# Patient Record
Sex: MALE | Race: BLACK OR AFRICAN AMERICAN | NOT HISPANIC OR LATINO | Employment: UNEMPLOYED | URBAN - METROPOLITAN AREA
[De-identification: names, ages, dates, MRNs, and addresses within clinical notes are randomized per-mention and may not be internally consistent; named-entity substitution may affect disease eponyms.]

---
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
## 2021-05-24 ENCOUNTER — HOSPITAL ENCOUNTER (EMERGENCY)
Facility: MEDICAL CENTER | Age: 21
End: 2021-05-24
Attending: EMERGENCY MEDICINE
Payer: MEDICAID

## 2021-05-24 ENCOUNTER — APPOINTMENT (OUTPATIENT)
Dept: RADIOLOGY | Facility: MEDICAL CENTER | Age: 21
End: 2021-05-24
Attending: EMERGENCY MEDICINE
Payer: MEDICAID

## 2021-05-24 VITALS
HEIGHT: 74 IN | TEMPERATURE: 97.8 F | OXYGEN SATURATION: 95 % | RESPIRATION RATE: 16 BRPM | HEART RATE: 80 BPM | SYSTOLIC BLOOD PRESSURE: 120 MMHG | WEIGHT: 185.85 LBS | BODY MASS INDEX: 23.85 KG/M2 | DIASTOLIC BLOOD PRESSURE: 89 MMHG

## 2021-05-24 DIAGNOSIS — K29.00 ACUTE GASTRITIS, PRESENCE OF BLEEDING UNSPECIFIED, UNSPECIFIED GASTRITIS TYPE: ICD-10-CM

## 2021-05-24 DIAGNOSIS — R10.13 EPIGASTRIC PAIN: ICD-10-CM

## 2021-05-24 LAB
ALBUMIN SERPL BCP-MCNC: 4.5 G/DL (ref 3.2–4.9)
ALBUMIN/GLOB SERPL: 1.4 G/DL
ALP SERPL-CCNC: 77 U/L (ref 30–99)
ALT SERPL-CCNC: 18 U/L (ref 2–50)
ANION GAP SERPL CALC-SCNC: 10 MMOL/L (ref 7–16)
APPEARANCE UR: CLEAR
AST SERPL-CCNC: 17 U/L (ref 12–45)
BASOPHILS # BLD AUTO: 0.4 % (ref 0–1.8)
BASOPHILS # BLD: 0.02 K/UL (ref 0–0.12)
BILIRUB SERPL-MCNC: 0.3 MG/DL (ref 0.1–1.5)
BILIRUB UR QL STRIP.AUTO: NEGATIVE
BUN SERPL-MCNC: 13 MG/DL (ref 8–22)
CALCIUM SERPL-MCNC: 9.9 MG/DL (ref 8.5–10.5)
CHLORIDE SERPL-SCNC: 105 MMOL/L (ref 96–112)
CO2 SERPL-SCNC: 26 MMOL/L (ref 20–33)
COLOR UR: YELLOW
CREAT SERPL-MCNC: 0.9 MG/DL (ref 0.5–1.4)
EOSINOPHIL # BLD AUTO: 0.05 K/UL (ref 0–0.51)
EOSINOPHIL NFR BLD: 1 % (ref 0–6.9)
ERYTHROCYTE [DISTWIDTH] IN BLOOD BY AUTOMATED COUNT: 39.9 FL (ref 35.9–50)
GLOBULIN SER CALC-MCNC: 3.3 G/DL (ref 1.9–3.5)
GLUCOSE SERPL-MCNC: 84 MG/DL (ref 65–99)
GLUCOSE UR STRIP.AUTO-MCNC: NEGATIVE MG/DL
HCT VFR BLD AUTO: 51.8 % (ref 42–52)
HGB BLD-MCNC: 16.8 G/DL (ref 14–18)
IMM GRANULOCYTES # BLD AUTO: 0.01 K/UL (ref 0–0.11)
IMM GRANULOCYTES NFR BLD AUTO: 0.2 % (ref 0–0.9)
KETONES UR STRIP.AUTO-MCNC: ABNORMAL MG/DL
LEUKOCYTE ESTERASE UR QL STRIP.AUTO: NEGATIVE
LIPASE SERPL-CCNC: 19 U/L (ref 11–82)
LYMPHOCYTES # BLD AUTO: 2.51 K/UL (ref 1–4.8)
LYMPHOCYTES NFR BLD: 51.9 % (ref 22–41)
MCH RBC QN AUTO: 28.8 PG (ref 27–33)
MCHC RBC AUTO-ENTMCNC: 32.4 G/DL (ref 33.7–35.3)
MCV RBC AUTO: 88.9 FL (ref 81.4–97.8)
MICRO URNS: ABNORMAL
MONOCYTES # BLD AUTO: 0.54 K/UL (ref 0–0.85)
MONOCYTES NFR BLD AUTO: 11.2 % (ref 0–13.4)
NEUTROPHILS # BLD AUTO: 1.71 K/UL (ref 1.82–7.42)
NEUTROPHILS NFR BLD: 35.3 % (ref 44–72)
NITRITE UR QL STRIP.AUTO: NEGATIVE
NRBC # BLD AUTO: 0 K/UL
NRBC BLD-RTO: 0 /100 WBC
PH UR STRIP.AUTO: 5.5 [PH] (ref 5–8)
PLATELET # BLD AUTO: 203 K/UL (ref 164–446)
PMV BLD AUTO: 10.3 FL (ref 9–12.9)
POTASSIUM SERPL-SCNC: 4 MMOL/L (ref 3.6–5.5)
PROT SERPL-MCNC: 7.8 G/DL (ref 6–8.2)
PROT UR QL STRIP: NEGATIVE MG/DL
RBC # BLD AUTO: 5.83 M/UL (ref 4.7–6.1)
RBC UR QL AUTO: NEGATIVE
SODIUM SERPL-SCNC: 141 MMOL/L (ref 135–145)
SP GR UR STRIP.AUTO: 1.03
UROBILINOGEN UR STRIP.AUTO-MCNC: 1 MG/DL
WBC # BLD AUTO: 4.8 K/UL (ref 4.8–10.8)

## 2021-05-24 PROCEDURE — 85025 COMPLETE CBC W/AUTO DIFF WBC: CPT

## 2021-05-24 PROCEDURE — 99284 EMERGENCY DEPT VISIT MOD MDM: CPT

## 2021-05-24 PROCEDURE — 80053 COMPREHEN METABOLIC PANEL: CPT

## 2021-05-24 PROCEDURE — 83690 ASSAY OF LIPASE: CPT

## 2021-05-24 PROCEDURE — 76705 ECHO EXAM OF ABDOMEN: CPT

## 2021-05-24 PROCEDURE — 81003 URINALYSIS AUTO W/O SCOPE: CPT

## 2021-05-24 RX ORDER — OMEPRAZOLE 20 MG/1
20 CAPSULE, DELAYED RELEASE ORAL DAILY
Qty: 30 CAPSULE | Refills: 0 | Status: SHIPPED | OUTPATIENT
Start: 2021-05-24 | End: 2021-10-08 | Stop reason: SDUPTHER

## 2021-05-24 RX ORDER — SUCRALFATE ORAL 1 G/10ML
1 SUSPENSION ORAL
Qty: 200 ML | Refills: 2 | Status: SHIPPED | OUTPATIENT
Start: 2021-05-24 | End: 2022-03-01

## 2021-05-24 NOTE — DISCHARGE INSTRUCTIONS
Avoid ibuprofen, Naprosyn, Advil, Motrin, and other NSAIDs    You may take Tylenol as needed for pain    You have gallbladder sludge seen on your ultrasound today but no gallstones.  Your gallbladder may be contributing to your pain but is more likely that you are having gastritis or inflammation of the stomach lining.  This is treated with an antacid.  We are prescribing Prilosec and have also recommended that you take Carafate for your symptoms.  Avoid spicy foods.    Return to the ER for ongoing pain, worsening pain, vomiting, fever, or other concerns.    Follow-up with a primary care doctor at the \[facility]\"" clinic as well as a stomach doctor for possible endoscopy where they look with a camera at your stomach lining to make sure you do not have ulcers or other issues.

## 2021-05-24 NOTE — ED TRIAGE NOTES
"Chief Complaint   Patient presents with   • Abdominal Pain     x 1 month, generalized intermittent burning pain. pt denies N/V/D.      Pt ambulatory to triage for above.   Pt denies N/V/D, fevers, and dysuria.     /89   Pulse 89   Temp 36.6 °C (97.8 °F) (Temporal)   Resp 16   Ht 1.88 m (6' 2\")   Wt 84.3 kg (185 lb 13.6 oz)   SpO2 95%   BMI 23.86 kg/m²     "

## 2021-05-24 NOTE — ED PROVIDER NOTES
ED Provider Note    CHIEF COMPLAINT  Chief Complaint   Patient presents with   • Abdominal Pain     x 1 month, generalized intermittent burning pain. pt denies N/V/D.        HPI  Iain Murray is a 21 y.o. male with history of asthma who presents complaining of epigastric burning sensation for 1 month. Patient recalls eating hot wings in [place] while visiting his brother just prior to onset of symptoms. He states this has been a daily occurrence and seems to get worse when he eats spicy food.    Patient denies nausea, vomiting, diarrhea, melena, hematochezia, chest pain, shortness of breath, back pain. Mom has been medicating the patient with ibuprofen.    Patient has never had endoscopy. No family history of ulcers. Patient denies tobacco and alcohol use.      ALLERGIES  No Known Allergies    CURRENT MEDICATIONS  Home Medications     Reviewed by Alison Reich R.N. (Registered Nurse) on 05/24/21 at 1019  Med List Status: Partial   Medication Last Dose Status   acetaminophen/caffeine/butalbital 325-40-50 mg (FIORICET) -40 MG Tab  Active   albuterol (PROVENTIL) 2.5mg/0.5ml Nebu Soln  Active   amoxicillin (AMOXIL) 500 MG Cap  Active   hydrocodone-acetaminophen (NORCO) 5-325 MG Tab per tablet  Active   SUMAtriptan (IMITREX) 25 MG Tab tablet  Active                PAST MEDICAL HISTORY   has a past medical history of Dental abscess, Migraine, Strep throat, and Unspecified asthma(493.90).    SURGICAL HISTORY  patient denies any surgical history    SOCIAL HISTORY  Social History     Tobacco Use   • Smoking status: Never Smoker   Substance and Sexual Activity   • Alcohol use: No   • Drug use: No   • Sexual activity: Not on file       Family Hx:  No family history of GERD or peptic ulcer disease      REVIEW OF SYSTEMS  See HPI for further details.  All other systems are negative except as above in HPI.    PHYSICAL EXAM  VITAL SIGNS: /89   Pulse 80   Temp 36.6 °C (97.8 °F) (Temporal)   Resp 16   Ht  "1.88 m (6' 2\")   Wt 84.3 kg (185 lb 13.6 oz)   SpO2 95%   BMI 23.86 kg/m²    General:  WDWN, nontoxic appearing in NAD; A+Ox3; V/S as above   Skin: warm and dry; good color; no rash  HEENT: NCAT; EOMs intact; PERRL; no scleral icterus   Neck: FROM; soft  Cardiovascular: Regular heart rate and rhythm.  No murmurs, rubs, or gallops; pulses 2+ bilaterally radially  Lungs: Clear to auscultation with good air movement bilaterally.  No wheezes, rhonchi, or rales.   Abdomen: BS present; soft; minimal epigastric tenderness, ND; no rebound, guarding, or rigidity.  No organomegaly or pulsatile mass  Extremities: DRAKE x 4; no e/o trauma  Neurologic: CNs III-XII grossly intact; speech clear; distal sensation intact; strength 5/5 UE/LEs  Psychiatric: Appropriate affect, normal mood    LABS  Results for orders placed or performed during the hospital encounter of 05/24/21   CBC WITH DIFFERENTIAL   Result Value Ref Range    WBC 4.8 4.8 - 10.8 K/uL    RBC 5.83 4.70 - 6.10 M/uL    Hemoglobin 16.8 14.0 - 18.0 g/dL    Hematocrit 51.8 42.0 - 52.0 %    MCV 88.9 81.4 - 97.8 fL    MCH 28.8 27.0 - 33.0 pg    MCHC 32.4 (L) 33.7 - 35.3 g/dL    RDW 39.9 35.9 - 50.0 fL    Platelet Count 203 164 - 446 K/uL    MPV 10.3 9.0 - 12.9 fL    Neutrophils-Polys 35.30 (L) 44.00 - 72.00 %    Lymphocytes 51.90 (H) 22.00 - 41.00 %    Monocytes 11.20 0.00 - 13.40 %    Eosinophils 1.00 0.00 - 6.90 %    Basophils 0.40 0.00 - 1.80 %    Immature Granulocytes 0.20 0.00 - 0.90 %    Nucleated RBC 0.00 /100 WBC    Neutrophils (Absolute) 1.71 (L) 1.82 - 7.42 K/uL    Lymphs (Absolute) 2.51 1.00 - 4.80 K/uL    Monos (Absolute) 0.54 0.00 - 0.85 K/uL    Eos (Absolute) 0.05 0.00 - 0.51 K/uL    Baso (Absolute) 0.02 0.00 - 0.12 K/uL    Immature Granulocytes (abs) 0.01 0.00 - 0.11 K/uL    NRBC (Absolute) 0.00 K/uL   COMP METABOLIC PANEL   Result Value Ref Range    Sodium 141 135 - 145 mmol/L    Potassium 4.0 3.6 - 5.5 mmol/L    Chloride 105 96 - 112 mmol/L    Co2 26 20 - 33 " mmol/L    Anion Gap 10.0 7.0 - 16.0    Glucose 84 65 - 99 mg/dL    Bun 13 8 - 22 mg/dL    Creatinine 0.90 0.50 - 1.40 mg/dL    Calcium 9.9 8.5 - 10.5 mg/dL    AST(SGOT) 17 12 - 45 U/L    ALT(SGPT) 18 2 - 50 U/L    Alkaline Phosphatase 77 30 - 99 U/L    Total Bilirubin 0.3 0.1 - 1.5 mg/dL    Albumin 4.5 3.2 - 4.9 g/dL    Total Protein 7.8 6.0 - 8.2 g/dL    Globulin 3.3 1.9 - 3.5 g/dL    A-G Ratio 1.4 g/dL   LIPASE   Result Value Ref Range    Lipase 19 11 - 82 U/L   URINALYSIS    Specimen: Urine   Result Value Ref Range    Color Yellow     Character Clear     Specific Gravity 1.026 <1.035    Ph 5.5 5.0 - 8.0    Glucose Negative Negative mg/dL    Ketones Trace (A) Negative mg/dL    Protein Negative Negative mg/dL    Bilirubin Negative Negative    Urobilinogen, Urine 1.0 Negative    Nitrite Negative Negative    Leukocyte Esterase Negative Negative    Occult Blood Negative Negative    Micro Urine Req see below    ESTIMATED GFR   Result Value Ref Range    GFR If African American >60 >60 mL/min/1.73 m 2    GFR If Non African American >60 >60 mL/min/1.73 m 2       IMAGING  US-RUQ   Final Result      1.  Small amount of sludge within the gallbladder. No gallstones or biliary ductal dilatation.      2.  Small right renal calyceal stone.          MEDICAL RECORD  I have reviewed patient's medical record and pertinent results are listed below.      COURSE & MEDICAL DECISION MAKING  I have reviewed any medical record information, laboratory studies and radiographic results as noted.    Iain Murray is a 21 y.o. male who presents complaining of epigastric burning which seems consistent with gastritis. Patient is afebrile and nontoxic-appearing with a soft, nonsurgical abdomen. He has some minimal epigastric tenderness. Labs demonstrate no significant abnormalities. Ultrasound was ordered and demonstrated gallbladder sludge which may be an incidental finding or may be contributing to his symptoms. This can be further  investigated if he does not respond to PPI therapy. We discussed dietary changes, return precautions, GI referral, and following up with a PCP which she has scheduled for later this month.    Appropriate PPE was worn at all times while interacting with the patient, including goggles, N95 mask, and surgical mask.      FINAL IMPRESSION  1. Epigastric pain     2. Acute gastritis, presence of bleeding unspecified, unspecified gastritis type         Electronically signed by: Babita Flores M.D., 5/24/2021 11:14 AM

## 2021-09-27 NOTE — PROGRESS NOTES
"HPI  Iain Murray is a 21 y.o. male presenting for evaluation of stomach pains.    Last evaluated in 7/21 for possible PUD - started on combo of omeprazole and famotidine, given GI referral as well as H pylori testing, which was not completed.  Pt reports that symptoms did resolve with use of all three medications omeprazole, famotidine, and as needed carafate.  Has met with GI, scheduled for EGD in Nov.    Does report radiation of pain to his back as well as mild constipation (stools 3x per week and stools are somewhat hard).  Denies diarrhea, nausea, vomiting.    Depression/anxiety -requesting refill of Zoloft.  Does feel as though medicine has helped, but is not giving him significant relief from anxiety.  Discussed increasing dose, he is open to this.     Insomnia-also reporting difficulties with sleep onset.  Has decreased screen time prior to bed, but has not made changes to routine prior to bed or consistent schedule.        PMH   Past Medical History:   Diagnosis Date   • Dental abscess    • Migraine    • Strep throat    • Unspecified asthma(493.90)        No past surgical history on file.    Social History     Tobacco Use   • Smoking status: Never Smoker   • Smokeless tobacco: Never Used   Substance Use Topics   • Alcohol use: No       No family history on file.      PE  /88 (BP Location: Right arm, Patient Position: Sitting, BP Cuff Size: Adult)   Pulse 75   Temp 37 °C (98.6 °F) (Temporal)   Resp 16   Ht 1.88 m (6' 2\")   Wt 88 kg (194 lb 1.6 oz)   SpO2 95%   BMI 24.92 kg/m²     Physical Exam  Constitutional:       General: He is not in acute distress.     Appearance: Normal appearance. He is not ill-appearing.   HENT:      Head: Normocephalic and atraumatic.      Nose: Nose normal.      Mouth/Throat:      Mouth: Mucous membranes are moist.      Pharynx: Oropharynx is clear.   Eyes:      Extraocular Movements: Extraocular movements intact.      Conjunctiva/sclera: Conjunctivae normal. "   Cardiovascular:      Rate and Rhythm: Normal rate and regular rhythm.      Pulses: Normal pulses.      Heart sounds: Normal heart sounds. No murmur heard.     Pulmonary:      Effort: Pulmonary effort is normal. No respiratory distress.      Breath sounds: Normal breath sounds. No wheezing, rhonchi or rales.   Abdominal:      General: Abdomen is flat. Bowel sounds are normal. There is no distension.      Palpations: Abdomen is soft. There is no mass.      Tenderness: There is no abdominal tenderness.   Musculoskeletal:         General: No swelling, deformity or signs of injury. Normal range of motion.      Cervical back: Normal range of motion and neck supple.   Lymphadenopathy:      Cervical: No cervical adenopathy.   Skin:     General: Skin is warm and dry.      Findings: No rash.   Neurological:      General: No focal deficit present.      Mental Status: He is alert and oriented to person, place, and time.          A/P:  Epigastric pain  Significant improvement in symptoms with combination omeprazole and famotidine  Has followed up with GI, as scheduled for EGD in November 2021    Plan:  Continue current medication regimen-we will refill omeprazole and famotidine  Call back with worsening symptoms, consider Maalox, GI cocktail in the future instead of sucralfate  Follow-up in approximately 3 months after appointment for EGD    Mixed anxiety depressive disorder  Getting benefit from Zoloft, but does not feel as though it is strong enough  Also struggling with insomnia still as below  Has not been able to find a counselor/therapist yet    Plan:  Increase Zoloft dose to 100 mg daily  Schedule meet and greet appointment with Dr. Corona through our clinic  We will follow up symptoms on increased dose    Insomnia  Onset insomnia, difficulty falling asleep  Has tried some sleep hygiene changes, but only eliminating screen time prior to bed  Discussed sleep hygiene including consistent schedule and routine prior to  bed  Information provided in AVS  May benefit from increased Zoloft dose

## 2021-10-08 ENCOUNTER — OFFICE VISIT (OUTPATIENT)
Dept: MEDICAL GROUP | Facility: CLINIC | Age: 21
End: 2021-10-08
Payer: MEDICAID

## 2021-10-08 VITALS
SYSTOLIC BLOOD PRESSURE: 127 MMHG | DIASTOLIC BLOOD PRESSURE: 88 MMHG | HEIGHT: 74 IN | TEMPERATURE: 98.6 F | WEIGHT: 194.1 LBS | RESPIRATION RATE: 16 BRPM | OXYGEN SATURATION: 95 % | BODY MASS INDEX: 24.91 KG/M2 | HEART RATE: 75 BPM

## 2021-10-08 DIAGNOSIS — F41.8 MIXED ANXIETY DEPRESSIVE DISORDER: ICD-10-CM

## 2021-10-08 DIAGNOSIS — R10.13 EPIGASTRIC PAIN: ICD-10-CM

## 2021-10-08 DIAGNOSIS — F51.01 PRIMARY INSOMNIA: ICD-10-CM

## 2021-10-08 PROBLEM — R51.9 HEADACHE: Status: ACTIVE | Noted: 2017-05-01

## 2021-10-08 PROBLEM — J45.990 ASTHMA, EXERCISE INDUCED: Status: ACTIVE | Noted: 2017-05-01

## 2021-10-08 PROBLEM — J30.9 ALLERGIC RHINITIS: Status: ACTIVE | Noted: 2018-11-20

## 2021-10-08 PROBLEM — G47.00 INSOMNIA: Status: ACTIVE | Noted: 2018-02-13

## 2021-10-08 PROBLEM — M54.9 BACKACHE: Status: ACTIVE | Noted: 2017-10-24

## 2021-10-08 PROBLEM — R45.89 DEPRESSED MOOD: Status: ACTIVE | Noted: 2021-05-27

## 2021-10-08 PROCEDURE — 99214 OFFICE O/P EST MOD 30 MIN: CPT | Mod: GC | Performed by: STUDENT IN AN ORGANIZED HEALTH CARE EDUCATION/TRAINING PROGRAM

## 2021-10-08 RX ORDER — FAMOTIDINE 20 MG/1
20 TABLET, FILM COATED ORAL
Qty: 90 TABLET | Refills: 1 | Status: SHIPPED | OUTPATIENT
Start: 2021-10-08 | End: 2022-12-09 | Stop reason: SDUPTHER

## 2021-10-08 RX ORDER — OMEPRAZOLE 20 MG/1
20 CAPSULE, DELAYED RELEASE ORAL DAILY
Qty: 90 CAPSULE | Refills: 1 | Status: SHIPPED | OUTPATIENT
Start: 2021-10-08 | End: 2022-03-01

## 2021-10-08 RX ORDER — FAMOTIDINE 20 MG/1
20 TABLET, FILM COATED ORAL
COMMUNITY
Start: 2021-07-20 | End: 2021-10-08 | Stop reason: SDUPTHER

## 2021-10-08 RX ORDER — SERTRALINE HYDROCHLORIDE 100 MG/1
100 TABLET, FILM COATED ORAL DAILY
Qty: 100 TABLET | Refills: 3 | Status: SHIPPED | OUTPATIENT
Start: 2021-10-08 | End: 2022-03-01

## 2021-10-08 ASSESSMENT — FIBROSIS 4 INDEX: FIB4 SCORE: 0.41

## 2021-10-08 NOTE — ASSESSMENT & PLAN NOTE
Onset insomnia, difficulty falling asleep  Has tried some sleep hygiene changes, but only eliminating screen time prior to bed  Discussed sleep hygiene including consistent schedule and routine prior to bed  Information provided in AVS  May benefit from increased Zoloft dose

## 2021-10-08 NOTE — PATIENT INSTRUCTIONS
Insomnia  Insomnia is a sleep disorder that makes it difficult to fall asleep or stay asleep. Insomnia can cause fatigue, low energy, difficulty concentrating, mood swings, and poor performance at work or school.  There are three different ways to classify insomnia:  · Difficulty falling asleep.  · Difficulty staying asleep.  · Waking up too early in the morning.  Any type of insomnia can be long-term (chronic) or short-term (acute). Both are common. Short-term insomnia usually lasts for three months or less. Chronic insomnia occurs at least three times a week for longer than three months.  What are the causes?  Insomnia may be caused by another condition, situation, or substance, such as:  · Anxiety.  · Certain medicines.  · Gastroesophageal reflux disease (GERD) or other gastrointestinal conditions.  · Asthma or other breathing conditions.  · Restless legs syndrome, sleep apnea, or other sleep disorders.  · Chronic pain.  · Menopause.  · Stroke.  · Abuse of alcohol, tobacco, or illegal drugs.  · Mental health conditions, such as depression.  · Caffeine.  · Neurological disorders, such as Alzheimer's disease.  · An overactive thyroid (hyperthyroidism).  Sometimes, the cause of insomnia may not be known.  What increases the risk?  Risk factors for insomnia include:  · Gender. Women are affected more often than men.  · Age. Insomnia is more common as you get older.  · Stress.  · Lack of exercise.  · Irregular work schedule or working night shifts.  · Traveling between different time zones.  · Certain medical and mental health conditions.  What are the signs or symptoms?  If you have insomnia, the main symptom is having trouble falling asleep or having trouble staying asleep. This may lead to other symptoms, such as:  · Feeling fatigued or having low energy.  · Feeling nervous about going to sleep.  · Not feeling rested in the morning.  · Having trouble concentrating.  · Feeling irritable, anxious, or depressed.  How  is this diagnosed?  This condition may be diagnosed based on:  · Your symptoms and medical history. Your health care provider may ask about:  ? Your sleep habits.  ? Any medical conditions you have.  ? Your mental health.  · A physical exam.  How is this treated?  Treatment for insomnia depends on the cause. Treatment may focus on treating an underlying condition that is causing insomnia. Treatment may also include:  · Medicines to help you sleep.  · Counseling or therapy.  · Lifestyle adjustments to help you sleep better.  Follow these instructions at home:  Eating and drinking    · Limit or avoid alcohol, caffeinated beverages, and cigarettes, especially close to bedtime. These can disrupt your sleep.  · Do not eat a large meal or eat spicy foods right before bedtime. This can lead to digestive discomfort that can make it hard for you to sleep.  Sleep habits    · Keep a sleep diary to help you and your health care provider figure out what could be causing your insomnia. Write down:  ? When you sleep.  ? When you wake up during the night.  ? How well you sleep.  ? How rested you feel the next day.  ? Any side effects of medicines you are taking.  ? What you eat and drink.  · Make your bedroom a dark, comfortable place where it is easy to fall asleep.  ? Put up shades or blackout curtains to block light from outside.  ? Use a white noise machine to block noise.  ? Keep the temperature cool.  · Limit screen use before bedtime. This includes:  ? Watching TV.  ? Using your smartphone, tablet, or computer.  · Stick to a routine that includes going to bed and waking up at the same times every day and night. This can help you fall asleep faster. Consider making a quiet activity, such as reading, part of your nighttime routine.  · Try to avoid taking naps during the day so that you sleep better at night.  · Get out of bed if you are still awake after 15 minutes of trying to sleep. Keep the lights down, but try reading or  doing a quiet activity. When you feel sleepy, go back to bed.  General instructions  · Take over-the-counter and prescription medicines only as told by your health care provider.  · Exercise regularly, as told by your health care provider. Avoid exercise starting several hours before bedtime.  · Use relaxation techniques to manage stress. Ask your health care provider to suggest some techniques that may work well for you. These may include:  ? Breathing exercises.  ? Routines to release muscle tension.  ? Visualizing peaceful scenes.  · Make sure that you drive carefully. Avoid driving if you feel very sleepy.  · Keep all follow-up visits as told by your health care provider. This is important.  Contact a health care provider if:  · You are tired throughout the day.  · You have trouble in your daily routine due to sleepiness.  · You continue to have sleep problems, or your sleep problems get worse.  Get help right away if:  · You have serious thoughts about hurting yourself or someone else.  If you ever feel like you may hurt yourself or others, or have thoughts about taking your own life, get help right away. You can go to your nearest emergency department or call:  · Your local emergency services (911 in the [place]  · A suicide crisis helpline, such as the National Suicide Prevention Lifeline at 1-569.227.3390. This is open 24 hours a day.  Summary  · Insomnia is a sleep disorder that makes it difficult to fall asleep or stay asleep.  · Insomnia can be long-term (chronic) or short-term (acute).  · Treatment for insomnia depends on the cause. Treatment may focus on treating an underlying condition that is causing insomnia.  · Keep a sleep diary to help you and your health care provider figure out what could be causing your insomnia.  This information is not intended to replace advice given to you by your health care provider. Make sure you discuss any questions you have with your health care provider.  Document  Released: 12/15/2001 Document Revised: 11/30/2018 Document Reviewed: 09/27/2018  Elsevier Patient Education © 2020 [employer]

## 2021-10-08 NOTE — ASSESSMENT & PLAN NOTE
Significant improvement in symptoms with combination omeprazole and famotidine  Has followed up with GI, as scheduled for EGD in November 2021    Plan:  Continue current medication regimen-we will refill omeprazole and famotidine  Call back with worsening symptoms, consider Maalox, GI cocktail in the future instead of sucralfate  Follow-up in approximately 3 months after appointment for EGD

## 2021-10-08 NOTE — ASSESSMENT & PLAN NOTE
Getting benefit from Zoloft, but does not feel as though it is strong enough  Also struggling with insomnia still as below  Has not been able to find a counselor/therapist yet    Plan:  Increase Zoloft dose to 100 mg daily  Schedule meet and greet appointment with Dr. Corona through our clinic  We will follow up symptoms on increased dose

## 2022-03-01 ENCOUNTER — OFFICE VISIT (OUTPATIENT)
Dept: MEDICAL GROUP | Facility: CLINIC | Age: 22
End: 2022-03-01
Payer: MEDICAID

## 2022-03-01 VITALS
WEIGHT: 181 LBS | SYSTOLIC BLOOD PRESSURE: 135 MMHG | HEIGHT: 74 IN | DIASTOLIC BLOOD PRESSURE: 85 MMHG | HEART RATE: 89 BPM | OXYGEN SATURATION: 96 % | BODY MASS INDEX: 23.23 KG/M2

## 2022-03-01 DIAGNOSIS — F41.8 MIXED ANXIETY DEPRESSIVE DISORDER: ICD-10-CM

## 2022-03-01 DIAGNOSIS — R06.02 SHORTNESS OF BREATH: ICD-10-CM

## 2022-03-01 DIAGNOSIS — R10.13 EPIGASTRIC PAIN: ICD-10-CM

## 2022-03-01 PROCEDURE — 99213 OFFICE O/P EST LOW 20 MIN: CPT | Mod: GC | Performed by: STUDENT IN AN ORGANIZED HEALTH CARE EDUCATION/TRAINING PROGRAM

## 2022-03-01 RX ORDER — ESCITALOPRAM OXALATE 10 MG/1
10 TABLET ORAL DAILY
Qty: 60 TABLET | Refills: 0 | Status: SHIPPED | OUTPATIENT
Start: 2022-03-01 | End: 2022-05-16

## 2022-03-01 RX ORDER — OMEPRAZOLE 40 MG/1
CAPSULE, DELAYED RELEASE ORAL
COMMUNITY
Start: 2022-02-24 | End: 2024-03-12

## 2022-03-01 ASSESSMENT — FIBROSIS 4 INDEX: FIB4 SCORE: 0.43

## 2022-03-01 NOTE — LETTER
3/1/2022    To Whom it May Concern:  From: [facility]    Re: Pre-Op Medical Clearance    Our patient, Iain Murray, : 2000, was seen in clinic 3/1/2022 for preop clearance prior to E[facility] with [facility].  On evaluation of symptoms, history, METs, and ECG in house, patient has low risk of cardiac event with EGD and anesthesia and can proceed with EGD.    Please contact my office with any questions.  [phone]          Adama Holley MD

## 2022-03-01 NOTE — ASSESSMENT & PLAN NOTE
Transition to escitalopram to trial another SSRI  Wean sertraline over the next 2 weeks as we increase escitalopram dose  F/u in 1 month

## 2022-03-01 NOTE — ASSESSMENT & PLAN NOTE
Likely noncardiac in origin.  Possible panic attacks related to anxiety vs muscle spasm/costochondritis  ECG in office today normal    EKG Interpretation-HR is ~80 normal EKG, normal sinus rhythm, there are no previous tracings available for comparison.  Normal axis, normal intervals, no conduction delay, no ST changes.    Minimal cardiac risk during procedure  Note written and will send to [facility]  Followup PRN

## 2022-04-26 ENCOUNTER — HOSPITAL ENCOUNTER (OUTPATIENT)
Dept: RADIOLOGY | Facility: MEDICAL CENTER | Age: 22
End: 2022-04-26
Attending: INTERNAL MEDICINE
Payer: MEDICAID

## 2022-04-26 DIAGNOSIS — R07.9 CHEST PAIN, UNSPECIFIED TYPE: ICD-10-CM

## 2022-04-26 DIAGNOSIS — R11.0 NAUSEA: ICD-10-CM

## 2022-04-26 DIAGNOSIS — R10.13 ABDOMINAL PAIN, EPIGASTRIC: ICD-10-CM

## 2022-04-26 PROCEDURE — A9541 TC99M SULFUR COLLOID: HCPCS

## 2022-05-16 ENCOUNTER — OFFICE VISIT (OUTPATIENT)
Dept: MEDICAL GROUP | Facility: CLINIC | Age: 22
End: 2022-05-16
Payer: MEDICAID

## 2022-05-16 VITALS
BODY MASS INDEX: 22.37 KG/M2 | OXYGEN SATURATION: 94 % | HEART RATE: 86 BPM | HEIGHT: 75 IN | SYSTOLIC BLOOD PRESSURE: 125 MMHG | WEIGHT: 179.9 LBS | DIASTOLIC BLOOD PRESSURE: 91 MMHG

## 2022-05-16 DIAGNOSIS — K30 DELAYED GASTRIC EMPTYING: ICD-10-CM

## 2022-05-16 DIAGNOSIS — F32.A DEPRESSION, UNSPECIFIED DEPRESSION TYPE: ICD-10-CM

## 2022-05-16 PROCEDURE — 99213 OFFICE O/P EST LOW 20 MIN: CPT | Mod: GE | Performed by: STUDENT IN AN ORGANIZED HEALTH CARE EDUCATION/TRAINING PROGRAM

## 2022-05-16 RX ORDER — METOCLOPRAMIDE 10 MG/1
10 TABLET ORAL EVERY 6 HOURS PRN
COMMUNITY
Start: 2022-04-26 | End: 2022-05-16

## 2022-05-16 RX ORDER — METOCLOPRAMIDE HYDROCHLORIDE 5 MG/5ML
5-10 SOLUTION ORAL
Qty: 600 ML | Refills: 0 | Status: SHIPPED | OUTPATIENT
Start: 2022-05-16 | End: 2022-06-15

## 2022-05-16 ASSESSMENT — FIBROSIS 4 INDEX: FIB4 SCORE: 0.43

## 2022-05-16 ASSESSMENT — PATIENT HEALTH QUESTIONNAIRE - PHQ9
CLINICAL INTERPRETATION OF PHQ2 SCORE: 5
SUM OF ALL RESPONSES TO PHQ QUESTIONS 1-9: 22
5. POOR APPETITE OR OVEREATING: 3 - NEARLY EVERY DAY

## 2022-05-17 NOTE — ASSESSMENT & PLAN NOTE
Increase sertraline from 50 mg to 100 mg. Would likely benefit from adjunct therapy however patient would like to hold off on adding another medication for now.     Provided crisis numbers    Supplied patient with counseling resources and encouraged routine exercise.     Provided reassurance and support regarding somatic concerns.     RTC in 2-4 weeks to discuss efficacy

## 2022-05-17 NOTE — PROGRESS NOTES
"Subjective:     CC: follow up test results    HPI:   Iain presents today with depression symptoms and to discuss gastric emptying results    Problem   Delayed Gastric Emptying    NM Study Tougas method: results consistent with severe gastric emptying.     Findings at 1 hour:  100%  2 hours:   91%  4 hours:   45%    He has not been regularly taking his reglan. Has no upcoming appointments with GI     Depression    Conitnues to have depressive symptoms including SI and additional symptoms of CELIA as well with panic attacks and pervasive anxiety. Interested in stating therapy. Sleeping improved with sertraline.            Current Outpatient Medications Ordered in Epic   Medication Sig Dispense Refill   • metoclopramide (REGLAN) 10 MG Tab Take 10 mg by mouth as needed in the morning and 10 mg as needed at noon and 10 mg as needed in the evening and 10 mg as needed before bedtime.     • omeprazole (PRILOSEC) 40 MG delayed-release capsule TAKE 1 CAP BY MOUTH 2X A DAY 30 MINS BEFORE BREAKFAST MEAL & 30 MINS BEFORE DINNER MEAL     • sertraline (ZOLOFT) 50 MG Tab Take 1 Tablet by mouth every day. 14 Tablet 0   • escitalopram (LEXAPRO) 10 MG Tab Take 1 Tablet by mouth every day. 60 Tablet 0   • famotidine (PEPCID) 20 MG Tab Take 1 Tablet by mouth every day. 90 Tablet 1     No current Epic-ordered facility-administered medications on file.         ROS:  Gen: no fevers/chills, no changes in weight  Eyes: no changes in vision  ENT: no sore throat, no hearing loss, no bloody nose  Pulm: no sob, no cough  CV: no chest pain other than ongoing chronic sharp muscular cramping chest pains, no palpitations  GI: no nausea/vomiting, no diarrhea  : no dysuria  MSk: no myalgias  Skin: no rash  Neuro: no headaches, no numbness/tingling  Heme/Lymph: no easy bruising      Objective:     Exam:  BP (!) 125/91 (BP Location: Left arm, Patient Position: Sitting, BP Cuff Size: Adult)   Pulse 86   Ht 1.905 m (6' 3\")   Wt 81.6 kg (179 lb 14.4 " oz)   SpO2 94%   BMI 22.49 kg/m²  Body mass index is 22.49 kg/m².    Gen: Alert and oriented, No apparent distress.  Neck: Neck is supple without lymphadenopathy.  Lungs: Normal effort, CTA bilaterally, no wheezes, rhonchi, or rales  CV: Regular rate and rhythm. No murmurs, rubs, or gallops.  Ext: No clubbing, cyanosis, edema.      Labs: reviewed recent imaging and labs    Assessment & Plan:     22 y.o. male with the following -     Problem List Items Addressed This Visit     Depression     Increase sertraline from 50 mg to 100 mg. Would likely benefit from adjunct therapy however patient would like to hold off on adding another medication for now.     Provided crisis numbers    Supplied patient with counseling resources and encouraged routine exercise.     Provided reassurance and support regarding somatic concerns.     RTC in 2-4 weeks to discuss efficacy           Relevant Medications    sertraline (ZOLOFT) 50 MG Tab    Delayed gastric emptying     Initiate liquid metoclopramide 5-10 mg BID before meals for improved absorption and improved symptom relief.     Discussed dietary changes at length including decrease in fatty foods, all fast foods, and food high in fiber. Recommended blended foods and liquid forms of calorie intake as well as food journaling.     Advised returning to his GI doctor for further workup and advise. Glucose has been normal on previous workup, but could consider A1C in clinic at next visit.                Allison Hewitt MD  PGY-2 [facility] Resident

## 2022-05-17 NOTE — ASSESSMENT & PLAN NOTE
Initiate liquid metoclopramide 5-10 mg BID before meals for improved absorption and improved symptom relief.     Discussed dietary changes at length including decrease in fatty foods, all fast foods, and food high in fiber. Recommended blended foods and liquid forms of calorie intake as well as food journaling.     Advised returning to his GI doctor for further workup and advise. Glucose has been normal on previous workup, but could consider A1C in clinic at next visit.

## 2022-06-21 RX ORDER — METOCLOPRAMIDE 10 MG/1
TABLET ORAL
COMMUNITY
Start: 2022-05-24 | End: 2024-03-12

## 2022-06-21 RX ORDER — METOCLOPRAMIDE 10 MG/1
10 TABLET ORAL EVERY 6 HOURS PRN
Qty: 120 TABLET | OUTPATIENT
Start: 2022-06-21

## 2022-06-22 RX ORDER — METOCLOPRAMIDE 10 MG/1
10 TABLET ORAL EVERY 6 HOURS PRN
Qty: 120 TABLET | OUTPATIENT
Start: 2022-06-22

## 2022-07-06 ENCOUNTER — OFFICE VISIT (OUTPATIENT)
Dept: MEDICAL GROUP | Facility: CLINIC | Age: 22
End: 2022-07-06
Payer: MEDICAID

## 2022-07-06 VITALS
HEIGHT: 74 IN | HEART RATE: 86 BPM | DIASTOLIC BLOOD PRESSURE: 92 MMHG | BODY MASS INDEX: 23.1 KG/M2 | OXYGEN SATURATION: 92 % | TEMPERATURE: 98 F | SYSTOLIC BLOOD PRESSURE: 132 MMHG | WEIGHT: 180 LBS

## 2022-07-06 DIAGNOSIS — R10.11 RUQ PAIN: ICD-10-CM

## 2022-07-06 DIAGNOSIS — K30 DELAYED GASTRIC EMPTYING: ICD-10-CM

## 2022-07-06 DIAGNOSIS — F32.3 CURRENT SEVERE EPISODE OF MAJOR DEPRESSIVE DISORDER WITH PSYCHOTIC FEATURES, UNSPECIFIED WHETHER RECURRENT (HCC): ICD-10-CM

## 2022-07-06 PROBLEM — F32.9 MAJOR DEPRESSIVE DISORDER: Status: ACTIVE | Noted: 2021-05-27

## 2022-07-06 PROCEDURE — 99214 OFFICE O/P EST MOD 30 MIN: CPT | Mod: GC | Performed by: STUDENT IN AN ORGANIZED HEALTH CARE EDUCATION/TRAINING PROGRAM

## 2022-07-06 RX ORDER — FLUOXETINE HYDROCHLORIDE 20 MG/1
20 CAPSULE ORAL DAILY
Qty: 90 CAPSULE | Refills: 0 | Status: SHIPPED | OUTPATIENT
Start: 2022-07-06 | End: 2022-09-08 | Stop reason: SDUPTHER

## 2022-07-06 ASSESSMENT — FIBROSIS 4 INDEX: FIB4 SCORE: 0.43

## 2022-07-06 NOTE — ASSESSMENT & PLAN NOTE
Crisis numbers provided  List of psychologists provided   Psychiatry referral ordered    Patient amenable to restatring a new SSRI while awaiting psychiatry appointment     Very close follow up, minimum 2 weeks  Discussed benefits of finding opportunities for routine vigorous physical activity

## 2022-07-06 NOTE — PROGRESS NOTES
Subjective:     CC: depression, stomach pain    HPI:   Iain presents today with:    Problem   Ruq Pain    Constant RUQ pain, worsened with deep breath. Since 2-3 weeks ago. Possible history of similar one year ago. No change with eating, no alleviating factors. No radiation or associated symptoms. Last CMP 5/21, RUQ US done 5/21 with sludge in GB and renal calculus      Delayed Gastric Emptying    NM Study Tougas method: results consistent with severe gastric emptying.     Findings at 1 hour:  100%  2 hours:   91%  4 hours:   45%    He has not been regularly taking his reglan. Has no upcoming appointments with GI    Lifestyle changes since last visit include, smaller meals and more frequent meals which   Loss of appetite      Depression    Longstanding history of depression. Relatively severe with profound effects on his life. Symptoms started approximately 9-10 years ago. It has predominantly been present since witnessing his father's suicide at age 10 yo. His current symptoms include insomnia with 3-4 hours sleep/night, severe anhedonia, decreased energy, psychomotor retardation,  Audio hallucinations with his mother's scream, visual hallucinations with shadows that he knows are not there in his peripheral vision. He has somatic symptoms of ongoing abdominal pain that has been attributed to his mod-severe gastroparesis. Positive for SI with no attempt or plan.     Patient has been on zoloft 50 mg daily for more than 6 months with no effect. He self discontinued 1 month ago with no change in his symptoms. Is intereste in a new medication and a referral to psychiatry and psychology       Major Depressive Disorder    Longstanding history of depression. Relatively severe with profound effects on his life. Symptoms started approximately 9-10 years ago. It has predominantly been present since witnessing his father's suicide at age 10 yo. His current symptoms include insomnia with 3-4 hours sleep/night, severe  "anhedonia, decreased energy, psychomotor retardation,  Audio hallucinations with his mother's scream, visual hallucinations with shadows that he knows are not there in his peripheral vision. He has somatic symptoms of ongoing abdominal pain that has been attributed to his mod-severe gastroparesis. Positive for SI with no attempt or plan.     Patient has been on zoloft 50 mg daily for more than 6 months with no effect. He self discontinued 1 month ago with no change in his symptoms. Is intereste in a new medication and a referral to psychiatry and psychology         Current Outpatient Medications Ordered in Epic   Medication Sig Dispense Refill   • metoclopramide (REGLAN) 10 MG Tab TAKE 1 TABLET BY MOUTH EVERY 6 HOURS AS NEEDED FOR NAUSEA AND/OR VOMITING     • omeprazole (PRILOSEC) 40 MG delayed-release capsule TAKE 1 CAP BY MOUTH 2X A DAY 30 MINS BEFORE BREAKFAST MEAL & 30 MINS BEFORE DINNER MEAL     • famotidine (PEPCID) 20 MG Tab Take 1 Tablet by mouth every day. 90 Tablet 1     No current Epic-ordered facility-administered medications on file.       ROS:  Gen: no fevers/chills, no changes in weight  Eyes: no changes in vision  ENT: no sore throat, no hearing loss, no bloody nose  Pulm: no sob, no cough  CV: no chest pain, no palpitations  GI: no nausea/vomiting, no diarrhea  : no dysuria  MSk: no myalgias  Skin: no rash  Neuro: no headaches, no numbness/tingling  Heme/Lymph: no easy bruising      Objective:     Exam:  BP (!) 132/92 (BP Location: Left arm)   Pulse 86   Temp 36.7 °C (98 °F)   Ht 1.88 m (6' 2\")   Wt 81.6 kg (180 lb)   SpO2 92%   BMI 23.11 kg/m²  Body mass index is 23.11 kg/m².    Gen: Alert and oriented, No apparent distress.  Neck: Neck is supple without lymphadenopathy.  Lungs: Normal effort, CTA bilaterally, no wheezes, rhonchi, or rales  CV: Regular rate and rhythm. No murmurs, rubs, or gallops.  Ext: No clubbing, cyanosis, edema.        Labs: none to review    Assessment & Plan:     22 " y.o. male with the following -     Problem List Items Addressed This Visit     Depression     Crisis numbers provided  List of psychologists provided   Psychiatry referral ordered    Patient amenable to restatring a new SSRI while awaiting psychiatry appointment     Very close follow up, minimum 2 weeks  Discussed benefits of finding opportunities for routine vigorous physical activity           Relevant Medications    FLUoxetine (PROZAC) 20 MG Cap    Other Relevant Orders    TSH WITH REFLEX TO FT4    Referral to Psychology    Referral to Psychiatry    Delayed gastric emptying    RUQ pain     Do not suspect acute abdomen based on chronicity, VS, and  physical exam. Positive toussaint's, no rebound or guarding    CMP and RUQ US  RTC 2 weeks to discuss results           Relevant Orders    Comp Metabolic Panel    US-RUQ

## 2022-07-06 NOTE — ASSESSMENT & PLAN NOTE
Do not suspect acute abdomen based on chronicity, VS, and  physical exam. Positive toussaint's, no rebound or guarding    CMP and RUQ US  RTC 2 weeks to discuss results

## 2022-07-21 ENCOUNTER — APPOINTMENT (OUTPATIENT)
Dept: MEDICAL GROUP | Facility: CLINIC | Age: 22
End: 2022-07-21
Payer: MEDICAID

## 2022-07-28 ENCOUNTER — APPOINTMENT (OUTPATIENT)
Dept: MEDICAL GROUP | Facility: CLINIC | Age: 22
End: 2022-07-28
Payer: MEDICAID

## 2022-09-08 ENCOUNTER — OFFICE VISIT (OUTPATIENT)
Dept: MEDICAL GROUP | Facility: CLINIC | Age: 22
End: 2022-09-08
Payer: MEDICAID

## 2022-09-08 VITALS
DIASTOLIC BLOOD PRESSURE: 82 MMHG | HEIGHT: 74 IN | OXYGEN SATURATION: 95 % | TEMPERATURE: 98 F | BODY MASS INDEX: 22.72 KG/M2 | WEIGHT: 177 LBS | HEART RATE: 102 BPM | SYSTOLIC BLOOD PRESSURE: 114 MMHG

## 2022-09-08 DIAGNOSIS — F32.3 CURRENT SEVERE EPISODE OF MAJOR DEPRESSIVE DISORDER WITH PSYCHOTIC FEATURES, UNSPECIFIED WHETHER RECURRENT (HCC): ICD-10-CM

## 2022-09-08 PROCEDURE — 99213 OFFICE O/P EST LOW 20 MIN: CPT | Mod: GE | Performed by: STUDENT IN AN ORGANIZED HEALTH CARE EDUCATION/TRAINING PROGRAM

## 2022-09-08 RX ORDER — FLUOXETINE HYDROCHLORIDE 20 MG/1
20 CAPSULE ORAL DAILY
Qty: 90 CAPSULE | Refills: 0 | Status: SHIPPED | OUTPATIENT
Start: 2022-09-08 | End: 2022-12-09

## 2022-09-08 ASSESSMENT — FIBROSIS 4 INDEX: FIB4 SCORE: 0.43

## 2022-09-09 NOTE — PROGRESS NOTES
Subjective:     CC: Depression    HPI:   Iain presents today with:    Problem   Major Depressive Disorder    Longstanding history of severe depression, currently with profound effects on patient's life. Symptoms started approximately 9-10 years ago since witnessing his father's suicide at age 10 yo. His current symptoms include insomnia with 3-4 hours sleep/night followed by daytime sleeping, severe anhedonia, decreased energy, psychomotor retardation, slowed gastric motility,  Audio hallucinations with his mother's scream, visual hallucinations with shadows that he knows are not there in his peripheral vision. He has somatic symptoms of ongoing abdominal pain that has been attributed to his mod-severe gastroparesis. Positive for SI with no attempt or plan.     Patient has been on zoloft 50 mg daily for more than 6 months with no effect. He self discontinued 3 months ago with no change in his symptoms. Is interested in a new medication and a referral to psychiatry and psychology.     He was unable to  fluoxetine prescribed at last visit and he has not been able to complete the labs ordered at last visit (TSH and CMP). He has started with two different psychologists which he felt did not match well for him and he is currently looking for another. He reports that he has the resources he needs to continue this.          Current Outpatient Medications Ordered in Epic   Medication Sig Dispense Refill    FLUoxetine (PROZAC) 20 MG Cap Take 1 Capsule by mouth every day for 360 days. 90 Capsule 0    metoclopramide (REGLAN) 10 MG Tab TAKE 1 TABLET BY MOUTH EVERY 6 HOURS AS NEEDED FOR NAUSEA AND/OR VOMITING      omeprazole (PRILOSEC) 40 MG delayed-release capsule TAKE 1 CAP BY MOUTH 2X A DAY 30 MINS BEFORE BREAKFAST MEAL & 30 MINS BEFORE DINNER MEAL      famotidine (PEPCID) 20 MG Tab Take 1 Tablet by mouth every day. 90 Tablet 1     No current Epic-ordered facility-administered medications on file.         ROS:  Gen:  "no fevers/chills, no changes in weight  Eyes: no changes in vision  ENT: no sore throat, no hearing loss, no bloody nose  Pulm: no sob, no cough  CV: no chest pain, no palpitations  GI: no nausea/vomiting, no diarrhea  : no dysuria  MSk: no myalgias  Skin: no rash  Neuro: no headaches, no numbness/tingling  Heme/Lymph: no easy bruising      Objective:     Exam:  /82 (BP Location: Left arm, Patient Position: Sitting, BP Cuff Size: Adult)   Pulse (!) 102   Temp 36.7 °C (98 °F)   Ht 1.88 m (6' 2\")   Wt 80.3 kg (177 lb)   SpO2 95%   BMI 22.73 kg/m²  Body mass index is 22.73 kg/m².    Gen: Alert and oriented, No apparent distress.  Neck: Neck is supple without lymphadenopathy.  Lungs: Normal effort, CTA bilaterally, no wheezes, rhonchi, or rales  CV: Regular rate and rhythm. No murmurs, rubs, or gallops.  Ext: No clubbing, cyanosis, edema.      Labs: none completed to review    Assessment & Plan:     22 y.o. male with the following -     Problem List Items Addressed This Visit       Depression    Relevant Medications    FLUoxetine (PROZAC) 20 MG Cap    Other Relevant Orders    Referral to Psychiatry       Initiate fluoxetine 25 mg daily, if tolerates well will consider adding an antipsychotic such as Seroquel or mirtazapine at next visit in 2 weeks. Discussed again the black box warning on SSRI.      Close follow up in 2 weeks  Fluoxetine resent to pharmacy  Urgent psychiatry referral placed    Return in about 3 weeks (around 9/29/2022) for Short.      "

## 2022-09-09 NOTE — ASSESSMENT & PLAN NOTE
Initiate fluoxetine 25 mg daily, if tolerates well will consider adding an antipsychotic such as Seroquel or mirtazapine at next visit in 2 weeks. Discussed again the black box warning on SSRI.      Close follow up in 2 weeks  Fluoxetine resent to pharmacy  Urgent psychiatry referral placed

## 2022-12-09 ENCOUNTER — OFFICE VISIT (OUTPATIENT)
Dept: MEDICAL GROUP | Facility: CLINIC | Age: 22
End: 2022-12-09
Payer: MEDICAID

## 2022-12-09 VITALS
SYSTOLIC BLOOD PRESSURE: 137 MMHG | HEIGHT: 74 IN | DIASTOLIC BLOOD PRESSURE: 94 MMHG | HEART RATE: 88 BPM | BODY MASS INDEX: 23.49 KG/M2 | TEMPERATURE: 97.6 F | OXYGEN SATURATION: 96 % | WEIGHT: 183 LBS

## 2022-12-09 DIAGNOSIS — F32.3 CURRENT SEVERE EPISODE OF MAJOR DEPRESSIVE DISORDER WITH PSYCHOTIC FEATURES, UNSPECIFIED WHETHER RECURRENT (HCC): ICD-10-CM

## 2022-12-09 DIAGNOSIS — G89.29 OTHER CHRONIC BACK PAIN: ICD-10-CM

## 2022-12-09 DIAGNOSIS — M54.9 OTHER CHRONIC BACK PAIN: ICD-10-CM

## 2022-12-09 PROCEDURE — 99214 OFFICE O/P EST MOD 30 MIN: CPT | Mod: GC | Performed by: STUDENT IN AN ORGANIZED HEALTH CARE EDUCATION/TRAINING PROGRAM

## 2022-12-09 RX ORDER — FAMOTIDINE 20 MG/1
20 TABLET, FILM COATED ORAL
Qty: 90 TABLET | Refills: 1 | Status: SHIPPED | OUTPATIENT
Start: 2022-12-09 | End: 2022-12-30 | Stop reason: SDUPTHER

## 2022-12-09 RX ORDER — CYCLOBENZAPRINE HCL 5 MG
5 TABLET ORAL 3 TIMES DAILY PRN
Qty: 30 TABLET | Refills: 0 | Status: SHIPPED | OUTPATIENT
Start: 2022-12-09 | End: 2022-12-30 | Stop reason: SDUPTHER

## 2022-12-09 RX ORDER — ESCITALOPRAM OXALATE 10 MG/1
10 TABLET ORAL DAILY
Qty: 90 TABLET | Refills: 3 | Status: SHIPPED | OUTPATIENT
Start: 2022-12-09 | End: 2022-12-30 | Stop reason: SDUPTHER

## 2022-12-09 ASSESSMENT — FIBROSIS 4 INDEX: FIB4 SCORE: 0.43

## 2022-12-09 ASSESSMENT — PAIN SCALES - GENERAL: PAINLEVEL: 8=MODERATE-SEVERE PAIN
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
## 2022-12-10 NOTE — ASSESSMENT & PLAN NOTE
Physical exam and history consistent with muscle spasm and paraspinal muscle strain. No bony tenderness or midline symptoms. No new injury.     Recommend routine physical activity and once weekly physical therapy    Rx flexeril 5-10 mg up to BID for spasms  NSAIDs and icing for flares    Scoliosis X rays ordered to rule out malalignment

## 2022-12-10 NOTE — ASSESSMENT & PLAN NOTE
Severe depression with psychotic features with associated severe anxiety. There has been interval improvement in his passing SI, much less frequent, but worsening in his anxiety symptoms. He has not found a therapist he likes and has tried at least 2. Encouraged him to continue to try and find someone he can connect with and to stick with it for some period of time more than 3 sessions.     Urged him very strongly to follow up with psychiatry referral, number and other information provided.     Initiate escitalopram 10 mg daily. Counseled extensively on side effects, black box warning, and duration of onset as well as duration for titration.     Patient may need abilify or other atypical antipsychotic if this third SSRI is ineffective.     Close f/u in 2-4 weeks

## 2023-01-04 RX ORDER — ESCITALOPRAM OXALATE 10 MG/1
10 TABLET ORAL DAILY
Qty: 90 TABLET | Refills: 3 | Status: SHIPPED | OUTPATIENT
Start: 2023-01-04 | End: 2023-04-03 | Stop reason: SDUPTHER

## 2023-01-04 RX ORDER — CYCLOBENZAPRINE HCL 5 MG
5 TABLET ORAL 3 TIMES DAILY PRN
Qty: 30 TABLET | Refills: 0 | Status: SHIPPED | OUTPATIENT
Start: 2023-01-04 | End: 2023-04-03 | Stop reason: SDUPTHER

## 2023-01-04 RX ORDER — FAMOTIDINE 20 MG/1
20 TABLET, FILM COATED ORAL
Qty: 90 TABLET | Refills: 1 | Status: SHIPPED | OUTPATIENT
Start: 2023-01-04 | End: 2023-04-03 | Stop reason: SDUPTHER

## 2023-03-14 ENCOUNTER — APPOINTMENT (OUTPATIENT)
Dept: MEDICAL GROUP | Facility: CLINIC | Age: 23
End: 2023-03-14
Payer: MEDICAID

## 2023-04-04 RX ORDER — ESCITALOPRAM OXALATE 10 MG/1
10 TABLET ORAL DAILY
Qty: 90 TABLET | Refills: 3 | Status: SHIPPED | OUTPATIENT
Start: 2023-04-04 | End: 2023-05-20 | Stop reason: SDUPTHER

## 2023-04-04 RX ORDER — FAMOTIDINE 20 MG/1
20 TABLET, FILM COATED ORAL
Qty: 90 TABLET | Refills: 1 | Status: SHIPPED | OUTPATIENT
Start: 2023-04-04 | End: 2023-05-20 | Stop reason: SDUPTHER

## 2023-04-04 RX ORDER — CYCLOBENZAPRINE HCL 5 MG
5 TABLET ORAL 3 TIMES DAILY PRN
Qty: 30 TABLET | Refills: 0 | Status: SHIPPED | OUTPATIENT
Start: 2023-04-04 | End: 2023-05-20 | Stop reason: SDUPTHER

## 2023-05-23 RX ORDER — CYCLOBENZAPRINE HCL 5 MG
5 TABLET ORAL 3 TIMES DAILY PRN
Qty: 30 TABLET | Refills: 0 | Status: SHIPPED | OUTPATIENT
Start: 2023-05-23

## 2023-05-23 RX ORDER — FAMOTIDINE 20 MG/1
20 TABLET, FILM COATED ORAL
Qty: 90 TABLET | Refills: 1 | Status: SHIPPED | OUTPATIENT
Start: 2023-05-23

## 2023-05-23 RX ORDER — ESCITALOPRAM OXALATE 10 MG/1
10 TABLET ORAL DAILY
Qty: 90 TABLET | Refills: 3 | Status: SHIPPED | OUTPATIENT
Start: 2023-05-23 | End: 2023-08-21

## 2023-07-27 ENCOUNTER — APPOINTMENT (OUTPATIENT)
Dept: MEDICAL GROUP | Facility: CLINIC | Age: 23
End: 2023-07-27
Payer: MEDICAID

## 2023-08-24 ENCOUNTER — APPOINTMENT (OUTPATIENT)
Dept: MEDICAL GROUP | Facility: CLINIC | Age: 23
End: 2023-08-24
Payer: MEDICAID

## 2024-02-20 ENCOUNTER — APPOINTMENT (OUTPATIENT)
Dept: MEDICAL GROUP | Facility: CLINIC | Age: 24
End: 2024-02-20
Payer: MEDICAID

## 2024-03-12 ENCOUNTER — OFFICE VISIT (OUTPATIENT)
Dept: MEDICAL GROUP | Facility: CLINIC | Age: 24
End: 2024-03-12
Payer: MEDICAID

## 2024-03-12 VITALS
DIASTOLIC BLOOD PRESSURE: 87 MMHG | WEIGHT: 169 LBS | SYSTOLIC BLOOD PRESSURE: 139 MMHG | TEMPERATURE: 98.6 F | BODY MASS INDEX: 21.69 KG/M2 | RESPIRATION RATE: 16 BRPM | HEART RATE: 105 BPM | HEIGHT: 74 IN | OXYGEN SATURATION: 95 %

## 2024-03-12 DIAGNOSIS — K21.9 GASTROESOPHAGEAL REFLUX DISEASE, UNSPECIFIED WHETHER ESOPHAGITIS PRESENT: ICD-10-CM

## 2024-03-12 DIAGNOSIS — K30 DELAYED GASTRIC EMPTYING: ICD-10-CM

## 2024-03-12 DIAGNOSIS — Z23 NEED FOR VACCINATION: ICD-10-CM

## 2024-03-12 DIAGNOSIS — R10.84 GENERALIZED ABDOMINAL PAIN: ICD-10-CM

## 2024-03-12 DIAGNOSIS — R10.13 EPIGASTRIC PAIN: ICD-10-CM

## 2024-03-12 PROCEDURE — 3075F SYST BP GE 130 - 139MM HG: CPT

## 2024-03-12 PROCEDURE — 90686 IIV4 VACC NO PRSV 0.5 ML IM: CPT

## 2024-03-12 PROCEDURE — 90471 IMMUNIZATION ADMIN: CPT | Mod: GC

## 2024-03-12 PROCEDURE — 3079F DIAST BP 80-89 MM HG: CPT

## 2024-03-12 PROCEDURE — 99214 OFFICE O/P EST MOD 30 MIN: CPT | Mod: 25,GC

## 2024-03-12 RX ORDER — OMEPRAZOLE 40 MG/1
40 CAPSULE, DELAYED RELEASE ORAL DAILY
Qty: 90 CAPSULE | Refills: 1 | Status: SHIPPED | OUTPATIENT
Start: 2024-03-12

## 2024-03-12 RX ORDER — METOCLOPRAMIDE 10 MG/1
10 TABLET ORAL 2 TIMES DAILY PRN
Qty: 90 TABLET | Refills: 1 | Status: SHIPPED | OUTPATIENT
Start: 2024-03-12

## 2024-03-12 NOTE — PROGRESS NOTES
This note is formatted in an APSO format, for additional subjective and objective evaluation please scroll to the bottom of the note.    CC:  Chief Complaint   Patient presents with    GI Problem     3 years- feels like his insides is having a rug burn.        Assessment/Plan:  Problem List Items Addressed This Visit       Epigastric pain    Relevant Orders    Referral to Gastroenterology    US-ABDOMEN COMPLETE SURVEY    Delayed gastric emptying     He is not sure if reglan was helping. We will try it again. If it doesn't help, may consider trying remeron.  - Reglan         Relevant Orders    Referral to Gastroenterology    US-ABDOMEN COMPLETE SURVEY    Generalized abdominal pain     Worse in epigastric and LUQ. Previously documented RUQ pain, GB sludge on last u/s. Worse after eating, some foods like vegetables incl broccoli are okay but he does have pain on eating meats and some starches. No clear pattern. Pain occurs immediately after/while eating.  - GI referral  - Abdominal ultrasound  - Food diary         Acid reflux     Hx of gastroparesis and he reports tasting acidic flavor in the back of his mouth, likely reflux. Will restart PPI.         Relevant Medications    omeprazole (PRILOSEC) 40 MG delayed-release capsule    metoclopramide (REGLAN) 10 MG Tab     Other Visit Diagnoses       Need for vaccination        Relevant Orders    INFLUENZA VACCINE QUAD INJ (PF) (Completed)           Orders Placed This Encounter    US-ABDOMEN COMPLETE SURVEY    INFLUENZA VACCINE QUAD INJ (PF)    Referral to Gastroenterology    omeprazole (PRILOSEC) 40 MG delayed-release capsule    metoclopramide (REGLAN) 10 MG Tab       No follow-ups on file.      LABS: Results reviewed and discussed with the patient, questions answered.    HISTORY OF PRESENT ILLNESS: Patient is a 24 y.o. male established patient who presents today with:    Saw GI consultants a year ago. Pain is a little worse since then. Epigastric abdominal soreness when he  urinates or has a BM. Belly pain after urinating in the morning. Sharp pain in LUQ, random every couple of weeks. Not associated with eating. Pain worsening after eating. He is eating less. Lost 10 lb in the last year. Sometimes he feels nausea. No vomiting.    Problem   Generalized Abdominal Pain   Acid Reflux   Delayed Gastric Emptying    LM 7/6/22:  NM Study Tougas method: results consistent with severe gastric emptying.     Findings at 1 hour:  100%  2 hours:   91%  4 hours:   45%     Epigastric Pain       1. Need for vaccination  INFLUENZA VACCINE QUAD INJ (PF)      2. Epigastric pain  Referral to Gastroenterology    US-ABDOMEN COMPLETE SURVEY      3. Delayed gastric emptying  Referral to Gastroenterology    US-ABDOMEN COMPLETE SURVEY      4. Generalized abdominal pain        5. Gastroesophageal reflux disease, unspecified whether esophagitis present            Patient Active Problem List    Diagnosis Date Noted    Generalized abdominal pain 03/14/2024    Acid reflux 03/14/2024    RUQ pain 07/06/2022    Delayed gastric emptying 05/16/2022    Shortness of breath 03/01/2022    Depression 06/10/2021    Major depressive disorder 05/27/2021    Epigastric pain 05/27/2021    Allergic rhinitis 11/20/2018    Insomnia 02/13/2018    Backache 10/24/2017    Asthma, exercise induced 05/01/2017    Headache 05/01/2017      Allergies:Patient has no known allergies.    Current Outpatient Medications   Medication Sig Dispense Refill    omeprazole (PRILOSEC) 40 MG delayed-release capsule Take 1 Capsule by mouth every day. 90 Capsule 1    metoclopramide (REGLAN) 10 MG Tab Take 1 Tablet by mouth 2 times a day as needed (nausea, fullness of stomach, vomiting). 90 Tablet 1    famotidine (PEPCID) 20 MG Tab Take 1 Tablet by mouth every day. (Patient not taking: Reported on 3/12/2024) 90 Tablet 1    cyclobenzaprine (FLEXERIL) 5 mg tablet Take 1 Tablet by mouth 3 times a day as needed for Muscle Spasms, Moderate Pain or Mild Pain.  "(Patient not taking: Reported on 3/12/2024) 30 Tablet 0     No current facility-administered medications for this visit.       Social History     Tobacco Use    Smoking status: Never    Smokeless tobacco: Never   Vaping Use    Vaping Use: Never used   Substance Use Topics    Alcohol use: No    Drug use: No     Social History     Social History Narrative    Not on file       No family history on file.    Exam:    /87 (BP Location: Left arm, Patient Position: Sitting, BP Cuff Size: Adult)   Pulse (!) 105   Temp 37 °C (98.6 °F) (Temporal)   Resp 16   Ht 1.88 m (6' 2\")   Wt 76.7 kg (169 lb)   SpO2 95%   BMI 21.70 kg/m²  Body mass index is 21.7 kg/m².    Gen: Well appearing. No apparent distress. Well developed. Sitting comfortably on exam table  HEENT: NCAT, MMM  Neck: Supple, FROM, No LAD  Chest: No deformities, Equal chest expansion  Lungs: Normal effort, CTA bilaterally.  CV: Regular rate and rhythm. Pulse palpable. No murmur  Abd: Non-distended. Soft, no guarding or rebound. No masses. Mild generalized tenderness to palpation, worse in LUQ. Hipolito's negative. No CVA tenderness.  Ext: No cyanosis. No edema.  Skin: Warm/dry. No visible rashes.  Neuro: Non-focal. A&Ox4.  Psych: Normal behavior, normal affect      Ranijth Daly MD  [facility] Resident    "

## 2024-03-14 PROBLEM — R10.84 GENERALIZED ABDOMINAL PAIN: Status: ACTIVE | Noted: 2024-03-14

## 2024-03-14 PROBLEM — K21.9 ACID REFLUX: Status: ACTIVE | Noted: 2024-03-14

## 2024-03-14 NOTE — ASSESSMENT & PLAN NOTE
Hx of gastroparesis and he reports tasting acidic flavor in the back of his mouth, likely reflux. Will restart PPI.

## 2024-03-14 NOTE — ASSESSMENT & PLAN NOTE
He is not sure if reglan was helping. We will try it again. If it doesn't help, may consider trying remeron.  - Reglan

## 2024-03-14 NOTE — ASSESSMENT & PLAN NOTE
Worse in epigastric and LUQ. Previously documented RUQ pain, GB sludge on last u/s. Worse after eating, some foods like vegetables incl broccoli are okay but he does have pain on eating meats and some starches. No clear pattern. Pain occurs immediately after/while eating.  - GI referral  - Abdominal ultrasound  - Food diary

## 2024-05-28 ENCOUNTER — OFFICE VISIT (OUTPATIENT)
Dept: MEDICAL GROUP | Facility: CLINIC | Age: 24
End: 2024-05-28
Payer: MEDICAID

## 2024-05-28 VITALS
DIASTOLIC BLOOD PRESSURE: 91 MMHG | HEIGHT: 74 IN | BODY MASS INDEX: 23.74 KG/M2 | OXYGEN SATURATION: 96 % | WEIGHT: 185 LBS | SYSTOLIC BLOOD PRESSURE: 128 MMHG | RESPIRATION RATE: 16 BRPM | TEMPERATURE: 97.6 F | HEART RATE: 87 BPM

## 2024-05-28 DIAGNOSIS — R10.84 GENERALIZED ABDOMINAL PAIN: ICD-10-CM

## 2024-05-28 DIAGNOSIS — F32.9 MAJOR DEPRESSIVE DISORDER WITH CURRENT ACTIVE EPISODE, UNSPECIFIED DEPRESSION EPISODE SEVERITY, UNSPECIFIED WHETHER RECURRENT: ICD-10-CM

## 2024-05-28 PROCEDURE — 99213 OFFICE O/P EST LOW 20 MIN: CPT | Mod: GE

## 2024-05-28 ASSESSMENT — ANXIETY QUESTIONNAIRES
7. FEELING AFRAID AS IF SOMETHING AWFUL MIGHT HAPPEN: NEARLY EVERY DAY
5. BEING SO RESTLESS THAT IT IS HARD TO SIT STILL: NEARLY EVERY DAY
1. FEELING NERVOUS, ANXIOUS, OR ON EDGE: NEARLY EVERY DAY
2. NOT BEING ABLE TO STOP OR CONTROL WORRYING: NEARLY EVERY DAY
3. WORRYING TOO MUCH ABOUT DIFFERENT THINGS: NEARLY EVERY DAY
6. BECOMING EASILY ANNOYED OR IRRITABLE: NEARLY EVERY DAY
GAD7 TOTAL SCORE: 21
4. TROUBLE RELAXING: NEARLY EVERY DAY

## 2024-05-28 ASSESSMENT — PATIENT HEALTH QUESTIONNAIRE - PHQ9
5. POOR APPETITE OR OVEREATING: 3 - NEARLY EVERY DAY
CLINICAL INTERPRETATION OF PHQ2 SCORE: 6
SUM OF ALL RESPONSES TO PHQ QUESTIONS 1-9: 25

## 2024-05-28 NOTE — PATIENT INSTRUCTIONS
Constipation:  - metmucil every day  - increase water intake  - decrease milk  - Dietary changes: high fiber foods like beans and salad    [employer]  [email or web address]

## 2024-05-28 NOTE — PROGRESS NOTES
This note is formatted in an APSO format, for additional subjective and objective evaluation please scroll to the bottom of the note.    CC:  Chief Complaint   Patient presents with    GI Problem     Improvement.      Takes reglan prn for stomach pain about 3 times per week, takes omeprazole and famotidine daily.  Eating well.  Hard stools twice every 3 days.   Appt with [facility] next month, abd us scheudled in 2 months    Assessment/Plan:  Problem List Items Addressed This Visit       Generalized abdominal pain     Improved with current regimen of prilosec 40, pepcid 20, and PRN reglan (which he takes 3x/week as needed for abd pain). Exam benign. He has an appt with [facility] coming up.  Pt is now reporting constipation which is severe enough that it could be causing his abdominal pain. He stools q3days and stools are hard.  - metamucil and dietary changes  - exercise  - discussed miralax as well  - RTC in 2-4 weeks to assess for improvement         Major depressive disorder     Severe depression but not at acute risk to self. Pt denies suicidal thoughts, denies hallucinations. He is trying to resolve his medical concerns so he can resume working. He has good support from his mother. Previously failed 2x SSRI. Severe anxiety may also be playing into the problem.  - Referral to psychiatry  - Consider wellbutrin or other non-ssri at next visit (pt has failed 2 ssri)  - Pt agrees to establish with a therapist. Resources given.  -rtc 2 wks           No orders of the defined types were placed in this encounter.      Return in about 2 weeks (around 6/11/2024).      HISTORY OF PRESENT ILLNESS: Patient is a 24 y.o. male established patient who presents today with:    Problem   Generalized Abdominal Pain   Major Depressive Disorder    History of Severe MDD without suicidal ideation. Per chart review, failed 2 SSRIs.           1. Major depressive disorder with current active episode, unspecified depression episode severity, unspecified  "whether recurrent        2. Generalized abdominal pain            Patient Active Problem List    Diagnosis Date Noted    Generalized abdominal pain 03/14/2024    Acid reflux 03/14/2024    RUQ pain 07/06/2022    Delayed gastric emptying 05/16/2022    Shortness of breath 03/01/2022    Major depressive disorder 05/27/2021    Epigastric pain 05/27/2021    Allergic rhinitis 11/20/2018    Insomnia 02/13/2018    Backache 10/24/2017    Asthma, exercise induced 05/01/2017    Headache 05/01/2017        Exam:    BP (!) 128/91 (BP Location: Left arm, Patient Position: Sitting, BP Cuff Size: Adult)   Pulse 87   Temp 36.4 °C (97.6 °F) (Temporal)   Resp 16   Ht 1.88 m (6' 2\")   Wt 83.9 kg (185 lb)   SpO2 96%   BMI 23.75 kg/m²  Body mass index is 23.75 kg/m².    Gen: Well appearing. No apparent distress. Well developed. Sitting comfortably on exam table  HEENT: NCAT, MMM, erythematous pharynx (patient reports is stable), no anterior cervical LAD.  Neck: Supple, FROM.  Normal thyroid, no supraclavicular LAD.  Chest: No deformities, Equal chest expansion  Lungs: Normal effort, CTA bilaterally.  CV: Regular rate and rhythm. Pulse palpable. No murmur  Abd: Non-distended.  No guarding.  No rebound tenderness.  Mild TTP in RLQ and LLQ.  No TTP elsewhere, no suprapubic tenderness.  Bowel sounds normal.  Ext: No cyanosis. No edema.  Skin: Warm/dry. No visible rashes.  Neuro: Non-focal. A&Ox4.  Psych: Normal behavior, normal affect.  Responds to questions appropriately.  Appropriately groomed.      Ranjith Daly MD  [facility] Res[facility]    "

## 2024-05-30 NOTE — ASSESSMENT & PLAN NOTE
Improved with current regimen of prilosec 40, pepcid 20, and PRN reglan (which he takes 3x/week as needed for abd pain). Exam benign. He has an appt with [facility] coming up.  Pt is now reporting constipation which is severe enough that it could be causing his abdominal pain. He stools q3days and stools are hard.  - metamucil and dietary changes  - exercise  - discussed miralax as well  - RTC in 2-4 weeks to assess for improvement

## 2024-05-30 NOTE — ASSESSMENT & PLAN NOTE
Severe depression but not at acute risk to self. Pt denies suicidal thoughts, denies hallucinations. He is trying to resolve his medical concerns so he can resume working. He has good support from his mother. Previously failed 2x SSRI. Severe anxiety may also be playing into the problem.  - Referral to psychiatry  - Consider wellbutrin or other non-ssri at next visit (pt has failed 2 ssri)  - Pt agrees to establish with a therapist. Resources given.  -rtc 2 wks

## 2024-08-11 ENCOUNTER — HOSPITAL ENCOUNTER (EMERGENCY)
Facility: MEDICAL CENTER | Age: 24
End: 2024-08-11
Attending: EMERGENCY MEDICINE
Payer: MEDICAID

## 2024-08-11 ENCOUNTER — PHARMACY VISIT (OUTPATIENT)
Dept: PHARMACY | Facility: MEDICAL CENTER | Age: 24
End: 2024-08-11
Payer: COMMERCIAL

## 2024-08-11 VITALS
HEART RATE: 72 BPM | HEIGHT: 74 IN | OXYGEN SATURATION: 98 % | DIASTOLIC BLOOD PRESSURE: 90 MMHG | WEIGHT: 192.68 LBS | TEMPERATURE: 97.1 F | SYSTOLIC BLOOD PRESSURE: 147 MMHG | RESPIRATION RATE: 15 BRPM | BODY MASS INDEX: 24.73 KG/M2

## 2024-08-11 DIAGNOSIS — K05.20 ACUTE PERICORONITIS: ICD-10-CM

## 2024-08-11 DIAGNOSIS — K08.89 PAIN, DENTAL: ICD-10-CM

## 2024-08-11 PROCEDURE — RXMED WILLOW AMBULATORY MEDICATION CHARGE: Performed by: EMERGENCY MEDICINE

## 2024-08-11 PROCEDURE — 99282 EMERGENCY DEPT VISIT SF MDM: CPT

## 2024-08-11 RX ORDER — AMOXICILLIN 500 MG/1
500 CAPSULE ORAL 3 TIMES DAILY
Qty: 21 CAPSULE | Refills: 0 | Status: ACTIVE | OUTPATIENT
Start: 2024-08-11 | End: 2024-08-18

## 2024-08-11 ASSESSMENT — PAIN DESCRIPTION - PAIN TYPE: TYPE: ACUTE PAIN

## 2024-08-11 NOTE — ED PROVIDER NOTES
CHIEF COMPLAINT  Chief Complaint   Patient presents with    Dental Pain     Right lower molar pain x 2 months, increased in pain in last fews days. 10/10 sharp pain       LIMITATION TO HISTORY   Select: none    HPI    Iain Murray is a 24 y.o. male who presents to the Emergency Department with a cracked tooth onset two months ago and worsening within the past few days. The patient states he talked to his dentist who recommended an ED visit for antibiotics. The patient notes that his dentist was not available for appointments until January or February of next year. He reports no pain when drinking hot fluids.    OUTSIDE HISTORIAN(S):  Select: Mother present at bedside and assisted providing history    EXTERNAL RECORDS REVIEWED  Select: None    PAST MEDICAL HISTORY  Past Medical History:   Diagnosis Date    Dental abscess     Migraine     Strep throat     Unspecified asthma(493.90)      .    SURGICAL HISTORY  History reviewed. No pertinent surgical history.      FAMILY HISTORY  History reviewed. No pertinent family history.       SOCIAL HISTORY  Social History     Tobacco Use    Smoking status: Never    Smokeless tobacco: Never   Vaping Use    Vaping status: Never Used   Substance Use Topics    Alcohol use: No    Drug use: No         CURRENT MEDICATIONS  No current facility-administered medications on file prior to encounter.     Current Outpatient Medications on File Prior to Encounter   Medication Sig Dispense Refill    omeprazole (PRILOSEC) 40 MG delayed-release capsule Take 1 Capsule by mouth every day. 90 Capsule 1    metoclopramide (REGLAN) 10 MG Tab Take 1 Tablet by mouth 2 times a day as needed (nausea, fullness of stomach, vomiting). 90 Tablet 1    famotidine (PEPCID) 20 MG Tab Take 1 Tablet by mouth every day. 90 Tablet 1    cyclobenzaprine (FLEXERIL) 5 mg tablet Take 1 Tablet by mouth 3 times a day as needed for Muscle Spasms, Moderate Pain or Mild Pain. 30 Tablet 0         ALLERGIES  No Known  "Allergies    PHYSICAL EXAM  VITAL SIGNS:BP (!) 146/92   Pulse 78   Temp 36.8 °C (98.3 °F) (Temporal)   Resp 16   Ht 1.88 m (6' 2\")   Wt 87.4 kg (192 lb 10.9 oz)   SpO2 100%   BMI 24.74 kg/m²     Constitutional: Well-developed no acute distress   HENT: Erythemous gingiva surrounding the right lower 3rd molar. Normocephalic, Atraumatic, Bilateral external ears normal.  Eyes:  conjunctiva are normal.   Neck: Normal. Supple.  Nontender midline  Cardiovascular: Regular rate and rhythm without murmurs gallops or rubs.   Thorax & Lungs: No respiratory distress. Breathing comfortably. Lungs are clear to auscultation bilaterally, there are no wheezes no rales. Chest wall is nontender.  Psychiatric: Affect normal, Judgment normal, Mood normal.     COURSE & MEDICAL DECISION MAKING    ED COURSE:    ED Observation Status? No, The patient does not qualify for observation status     INTERVENTIONS BY ME:  Medications - No data to display    2:18 PM - Patient seen and examined at bedside. He presents with signs of pericoronitis to the right lower wisdom tooth upon examination. I am prescribing antibiotics for treament of his symptoms and providing referrals to dental for followup. The patient agrees to my plan of care.      INITIAL ASSESSMENT, COURSE AND PLAN  Care Narrative: Patient looks more like he has pericoronitis so I have given him instructions on how to clean that out with using floss however he does have some mild jaw discomfort so I will start the patient empirically on amoxicillin.  He is to follow-up with a dentist for further outpatient treatment and care.     ADDITIONAL PROBLEM LIST  None    DISPOSITION AND DISCUSSIONS  I have discussed management of the patient with the following physicians and VALERIANO's: None    Escalation of care considered, and ultimately not performed: None    Barriers to care at this time, including but not limited to: None.     Decision tools and prescription drugs considered including, but " not limited to: As described above.     The patient will return for new or worsening symptoms and is stable at the time of discharge.    The patient is referred to a primary physician for blood pressure management, diabetic screening, and for all other preventative health concerns.    DISPOSITION:  Patient will be discharged home in stable condition.    FOLLOW UP:  A Dentist    Schedule an appointment as soon as possible for a visit   For further evaluation, Return if any symptoms worsen      OUTPATIENT MEDICATIONS:  Discharge Medication List as of 8/11/2024  2:42 PM        START taking these medications    Details   amoxicillin (AMOXIL) 500 MG Cap Take 1 Capsule by mouth 3 times a day for 7 days., Disp-21 Capsule, R-0, Normal               FINAL DIAGNOSIS  1. Pain, dental    2. Acute pericoronitis         Ronald BOWERS (Bang), am scribing for, and in the presence of, Chago Aceves M.D..    Electronically signed by: Ronald Hoyos (Bang), 8/11/2024    IChago M.D. personally performed the services described in this documentation, as scribed by Ronald Hoyos in my presence, and it is both accurate and complete.     Electronically signed by: Chago Aceves M.D.,3:47 PM 08/11/24

## 2024-08-11 NOTE — ED TRIAGE NOTES
Chief Complaint   Patient presents with    Dental Pain     Right lower molar pain x 2 months, increased in pain in last fews days. 10/10 sharp pain     Pt ambulatory to triage for above complaint.      Pt is alert/oriented and follows commands. Pt speaking in full sentences and responds appropriately to questions. No acute distress noted in triage and respirations are even and unlabored.     Pt placed in lobby and educated on triage process. Pt encouraged to alert staff for any changes in condition.

## 2024-12-11 ENCOUNTER — APPOINTMENT (OUTPATIENT)
Dept: MEDICAL GROUP | Facility: CLINIC | Age: 24
End: 2024-12-11
Payer: MEDICAID

## 2024-12-19 ENCOUNTER — OFFICE VISIT (OUTPATIENT)
Dept: MEDICAL GROUP | Facility: CLINIC | Age: 24
End: 2024-12-19
Payer: MEDICAID

## 2024-12-19 VITALS
HEART RATE: 96 BPM | WEIGHT: 178 LBS | RESPIRATION RATE: 16 BRPM | DIASTOLIC BLOOD PRESSURE: 88 MMHG | OXYGEN SATURATION: 97 % | HEIGHT: 73 IN | SYSTOLIC BLOOD PRESSURE: 140 MMHG | BODY MASS INDEX: 23.59 KG/M2

## 2024-12-19 DIAGNOSIS — F32.9 MAJOR DEPRESSIVE DISORDER WITH CURRENT ACTIVE EPISODE, UNSPECIFIED DEPRESSION EPISODE SEVERITY, UNSPECIFIED WHETHER RECURRENT: ICD-10-CM

## 2024-12-19 DIAGNOSIS — R10.13 EPIGASTRIC PAIN: ICD-10-CM

## 2024-12-19 DIAGNOSIS — F43.10 PTSD (POST-TRAUMATIC STRESS DISORDER): ICD-10-CM

## 2024-12-19 DIAGNOSIS — Z23 NEED FOR VACCINATION: ICD-10-CM

## 2024-12-19 DIAGNOSIS — M25.511 ACUTE PAIN OF RIGHT SHOULDER: ICD-10-CM

## 2024-12-19 PROCEDURE — 3077F SYST BP >= 140 MM HG: CPT

## 2024-12-19 PROCEDURE — 3079F DIAST BP 80-89 MM HG: CPT

## 2024-12-19 PROCEDURE — 99215 OFFICE O/P EST HI 40 MIN: CPT | Mod: 25

## 2024-12-19 PROCEDURE — 90471 IMMUNIZATION ADMIN: CPT

## 2024-12-19 PROCEDURE — 90656 IIV3 VACC NO PRSV 0.5 ML IM: CPT

## 2024-12-19 RX ORDER — MELOXICAM 7.5 MG/1
15 TABLET ORAL DAILY
Qty: 60 TABLET | Refills: 1 | Status: SHIPPED | OUTPATIENT
Start: 2024-12-19

## 2024-12-19 RX ORDER — BUPROPION HYDROCHLORIDE 150 MG/1
TABLET, EXTENDED RELEASE ORAL
Qty: 173 TABLET | Refills: 0 | Status: SHIPPED | OUTPATIENT
Start: 2024-12-19 | End: 2025-03-19

## 2024-12-19 ASSESSMENT — PATIENT HEALTH QUESTIONNAIRE - PHQ9
SUM OF ALL RESPONSES TO PHQ QUESTIONS 1-9: 24
5. POOR APPETITE OR OVEREATING: 3 - NEARLY EVERY DAY
CLINICAL INTERPRETATION OF PHQ2 SCORE: 6

## 2024-12-19 ASSESSMENT — ANXIETY QUESTIONNAIRES
4. TROUBLE RELAXING: MORE THAN HALF THE DAYS
6. BECOMING EASILY ANNOYED OR IRRITABLE: NEARLY EVERY DAY
3. WORRYING TOO MUCH ABOUT DIFFERENT THINGS: NEARLY EVERY DAY
5. BEING SO RESTLESS THAT IT IS HARD TO SIT STILL: MORE THAN HALF THE DAYS
GAD7 TOTAL SCORE: 18
7. FEELING AFRAID AS IF SOMETHING AWFUL MIGHT HAPPEN: NEARLY EVERY DAY
1. FEELING NERVOUS, ANXIOUS, OR ON EDGE: MORE THAN HALF THE DAYS
2. NOT BEING ABLE TO STOP OR CONTROL WORRYING: NEARLY EVERY DAY

## 2024-12-19 NOTE — ASSESSMENT & PLAN NOTE
Looking for [employer] work. Father committed suicide in 2010.  Patient does report that he thinks about being dead, but reports that he does not think about suicide, and certainly does not have a plan.  He reports that he would never kill himself because he would not put his mom through that.  He is well-groomed, well-appearing, normal mood and affect in the room.  He responds appropriately.  He is currently looking for a job.  His PHQ-9 is quite high, but there is no evidence of imminent danger to himself or others, and there is ample evidence that he is planning for the future and attempting to take care of himself, which is very reassuring.  He denies SI/HI.  He denies hallucinations.  He does sometimes have flashbacks of his mom screaming as she did when she found his father's body.  Based on history and exam, patient's presentation is consistent with major depressive disorder and posttraumatic stress disorder.  We will start treatment with Wellbutrin as patient has failed 2 SSRIs in the past (although it is not clear whether he was able to titrate up to a therapeutic dose for both of them).  It is worth considering adding a another medication for PTSD, however patient is already hesitant to start a medication so we will start slow, 1 at a time.  Patient has agreed to see a therapist and reports that he is working on it.  As he has not scheduled yet with a therapist, I have encouraged him to schedule an appointment with our clinic psychologist, and indeed he does have an appointment scheduled for 1/7/2025.  Patient agrees to follow-up in this clinic in January.  - He is working on getting into therapy  - starting wellbutrin today  - f/u 2-4 weeks  - ER/return precautions

## 2024-12-19 NOTE — ASSESSMENT & PLAN NOTE
Mild, acute. Some evidence of mild rotator cuff pain on p/e. 5/5 strength and normal sensation in UE bilat. No evidence of fracture or tear.  - mobic 15mg daily 1 month course  - topical voltaran  - RTC if pain does not resolve, will consider PT vs home exercises

## 2024-12-19 NOTE — PROGRESS NOTES
This note is formatted in an APSO format, for additional subjective and objective evaluation please scroll to the bottom of the note.    CC:   Chief Complaint   Patient presents with    Follow-Up     Assessment/Plan:  Problem List Items Addressed This Visit       Acute pain of right shoulder     Mild, acute. Some evidence of mild rotator cuff pain on p/e. 5/5 strength and normal sensation in UE bilat. No evidence of fracture or tear.  - mobic 15mg daily 1 month course  - topical voltaran  - RTC if pain does not resolve, will consider PT vs home exercises         Epigastric pain     Improved, pt thinks it was stress         Major depressive disorder     Looking for [employer] work. Father committed suicide in 2010.  Patient does report that he thinks about being dead, but reports that he does not think about suicide, and certainly does not have a plan.  He reports that he would never kill himself because he would not put his mom through that.  He is well-groomed, well-appearing, normal mood and affect in the room.  He responds appropriately.  He is currently looking for a job.  His PHQ-9 is quite high, but there is no evidence of imminent danger to himself or others, and there is ample evidence that he is planning for the future and attempting to take care of himself, which is very reassuring.  He denies SI/HI.  He denies hallucinations.  He does sometimes have flashbacks of his mom screaming as she did when she found his father's body.  Based on history and exam, patient's presentation is consistent with major depressive disorder and posttraumatic stress disorder.  We will start treatment with Wellbutrin as patient has failed 2 SSRIs in the past (although it is not clear whether he was able to titrate up to a therapeutic dose for both of them).  It is worth considering adding a another medication for PTSD, however patient is already hesitant to start a medication so we will start slow, 1 at a time.  Patient has  agreed to see a therapist and reports that he is working on it.  As he has not scheduled yet with a therapist, I have encouraged him to schedule an appointment with our clinic psychologist, and indeed he does have an appointment scheduled for 2025.  Patient agrees to follow-up in this clinic in January.  - He is working on getting into therapy  - starting wellbutrin today  - f/u 2-4 weeks  - ER/return precautions         Relevant Medications    buPROPion SR (WELLBUTRIN-SR) 150 MG TABLET SR 12 HR sustained-release tablet    PTSD (post-traumatic stress disorder)     Father  by suicide in . Also has depression, which we are treating with meds and therapy.         Relevant Medications    buPROPion SR (WELLBUTRIN-SR) 150 MG TABLET SR 12 HR sustained-release tablet     Other Visit Diagnoses       Need for vaccination        Relevant Orders    INFLUENZA VACCINE TRI INJ (PF)  (Completed)           Orders Placed This Encounter    INFLUENZA VACCINE TRI INJ (PF)     meloxicam (MOBIC) 7.5 MG Tab    buPROPion SR (WELLBUTRIN-SR) 150 MG TABLET SR 12 HR sustained-release tablet       HISTORY OF PRESENT ILLNESS:   About a month ago, he was moving a refrigerator with one arm. Felt a sharp pain in the deltoid. Now getting pain when he lifts his arm with resistance. Does not lift weights. No previous injuries. He feels like the pains seem to be happening less frequently. He has not taken any medication for it. He did try a medicated topical sticky pad, he is not sure what they were, but they helped. He hasn't been playing basketball due to the pain.    He has been doing pushups, which hurts his shoulder.     No hx of surgery.     Problem   Ptsd (Post-Traumatic Stress Disorder)   Acute Pain of Right Shoulder    Started after trying to lift a refrig in      Major Depressive Disorder    History of Severe MDD without suicidal ideation. Per chart review, failed 2 SSRIs.       Epigastric Pain         Exam:    BP (!) 140/88  "(BP Location: Left arm, Patient Position: Sitting, BP Cuff Size: Adult)   Pulse 96   Resp 16   Ht 1.854 m (6' 1\")   Wt 80.7 kg (178 lb)   SpO2 97%   BMI 23.48 kg/m²  Body mass index is 23.48 kg/m².    Gen: Well appearing. No apparent distress. Well developed. Sitting comfortably on chair, ambulates well  HEENT: NCAT, MMM  Neck: Supple, FROM  Chest: No deformities, Equal chest expansion  Lungs: Normal effort, CTA bilaterally.  CV: Regular rate and rhythm. Pulse palpable. No murmur  Abd: Non-distended.  Ext: No cyanosis. No edema.   Skin: Warm/dry. No visible rashes.  Neuro: Non-focal. A&Ox4.  Psych: Normal behavior, normal affect    I spent a total of 45 minutes with record review, exam, communication with the patient, communication with other providers, and documentation of this encounter.      Return in about 3 weeks (around 1/9/2025) for med f/u.    Ranjith Daly MD  [facility]    "

## 2025-01-02 NOTE — PROGRESS NOTES
"HPI  Iain Murray is a 22 y.o. male presenting for surgical clearance.    Problem   Shortness of Breath    Occasional, used to be associated with activity, now happens at random times including at rest.  Occasionally does also feel electricity type chest pain at random instances.  This is usually not concurrent with shortness of breath.    Full METs, can run, climb stairs, lift weights without chest pain or SOB, these symptoms are infrequent and random.  GI requesting clearance prior to Endoscopy for suspected PUD.     Mixed Anxiety Depressive Disorder    Reports no benefit from Sertraline 100mg.  Still having significant depression and generalized anxiety, likely with panic attacks as well.  Sertraline did help him sleep, but no other benefit.  Appetite poor d/t interest as well as pain with eating as above.     Epigastric Pain    Undergoing evaluation with GI, needs endoscopy.  Requesting cardiac clearance for anesthesia and EGD as below.              PMH   Past Medical History:   Diagnosis Date   • Dental abscess    • Migraine    • Strep throat    • Unspecified asthma(493.90)        No past surgical history on file.    Social History     Tobacco Use   • Smoking status: Never Smoker   • Smokeless tobacco: Never Used   Substance Use Topics   • Alcohol use: No       No family history on file.      PE  /85   Pulse 89   Ht 1.88 m (6' 2\")   Wt 82.1 kg (181 lb)   SpO2 96%   BMI 23.24 kg/m²     Physical Exam  Constitutional:       General: He is not in acute distress.     Appearance: Normal appearance. He is not ill-appearing.   HENT:      Head: Normocephalic and atraumatic.      Nose: Nose normal.      Mouth/Throat:      Mouth: Mucous membranes are moist.      Pharynx: Oropharynx is clear.   Eyes:      Extraocular Movements: Extraocular movements intact.      Conjunctiva/sclera: Conjunctivae normal.   Cardiovascular:      Rate and Rhythm: Normal rate and regular rhythm.      Pulses: Normal pulses.      Heart " sounds: Normal heart sounds. No murmur heard.    No friction rub. No gallop.   Pulmonary:      Effort: Pulmonary effort is normal. No respiratory distress.      Breath sounds: Normal breath sounds. No wheezing, rhonchi or rales.   Abdominal:      General: Abdomen is flat. Bowel sounds are normal. There is no distension.      Palpations: Abdomen is soft. There is no mass.      Tenderness: There is no abdominal tenderness.   Musculoskeletal:         General: No swelling, deformity or signs of injury. Normal range of motion.      Cervical back: Normal range of motion and neck supple.   Lymphadenopathy:      Cervical: No cervical adenopathy.   Skin:     General: Skin is warm and dry.      Findings: No rash.   Neurological:      General: No focal deficit present.      Mental Status: He is alert and oriented to person, place, and time.   Psychiatric:         Mood and Affect: Mood normal.         Behavior: Behavior normal.      Comments: Slightly blunted affect          A/P:  Shortness of breath  Likely noncardiac in origin.  Possible panic attacks related to anxiety vs muscle spasm/costochondritis  ECG in office today normal    EKG Interpretation-HR is ~80 normal EKG, normal sinus rhythm, there are no previous tracings available for comparison.  Normal axis, normal intervals, no conduction delay, no ST changes.    Minimal cardiac risk during procedure  Note written and will send to [facility]  Followup PRN        Mixed anxiety depressive disorder  Transition to escitalopram to trial another SSRI  Wean sertraline over the next 2 weeks as we increase escitalopram dose  F/u in 1 month    Epigastric pain  [facility] to perform EGD       no

## 2025-01-07 ENCOUNTER — APPOINTMENT (OUTPATIENT)
Dept: MEDICAL GROUP | Facility: CLINIC | Age: 25
End: 2025-01-07
Payer: MEDICAID

## 2025-01-31 ENCOUNTER — APPOINTMENT (OUTPATIENT)
Dept: MEDICAL GROUP | Facility: CLINIC | Age: 25
End: 2025-01-31
Payer: MEDICAID

## 2025-02-06 ENCOUNTER — APPOINTMENT (OUTPATIENT)
Dept: MEDICAL GROUP | Facility: CLINIC | Age: 25
End: 2025-02-06
Payer: MEDICAID

## 2025-02-06 ENCOUNTER — PATIENT MESSAGE (OUTPATIENT)
Dept: MEDICAL GROUP | Facility: CLINIC | Age: 25
End: 2025-02-06

## 2025-02-25 ENCOUNTER — OFFICE VISIT (OUTPATIENT)
Dept: MEDICAL GROUP | Facility: CLINIC | Age: 25
End: 2025-02-25
Payer: MEDICAID

## 2025-02-25 VITALS
BODY MASS INDEX: 22.59 KG/M2 | DIASTOLIC BLOOD PRESSURE: 82 MMHG | HEART RATE: 80 BPM | TEMPERATURE: 98.8 F | SYSTOLIC BLOOD PRESSURE: 132 MMHG | OXYGEN SATURATION: 95 % | HEIGHT: 74 IN | WEIGHT: 176 LBS

## 2025-02-25 DIAGNOSIS — M25.511 ACUTE PAIN OF RIGHT SHOULDER: ICD-10-CM

## 2025-02-25 DIAGNOSIS — K08.89 TOOTH PAIN: ICD-10-CM

## 2025-02-25 DIAGNOSIS — F32.9 MAJOR DEPRESSIVE DISORDER WITH CURRENT ACTIVE EPISODE, UNSPECIFIED DEPRESSION EPISODE SEVERITY, UNSPECIFIED WHETHER RECURRENT: ICD-10-CM

## 2025-02-25 DIAGNOSIS — F51.01 PRIMARY INSOMNIA: ICD-10-CM

## 2025-02-25 PROCEDURE — 99213 OFFICE O/P EST LOW 20 MIN: CPT

## 2025-02-25 PROCEDURE — 3079F DIAST BP 80-89 MM HG: CPT

## 2025-02-25 PROCEDURE — 3075F SYST BP GE 130 - 139MM HG: CPT

## 2025-02-25 RX ORDER — TRAZODONE HYDROCHLORIDE 50 MG/1
TABLET ORAL
Qty: 506 TABLET | Refills: 0 | Status: SHIPPED | OUTPATIENT
Start: 2025-02-25 | End: 2025-05-26

## 2025-02-25 RX ORDER — AMOXICILLIN 500 MG/1
500 CAPSULE ORAL 3 TIMES DAILY
Qty: 21 CAPSULE | Refills: 0 | Status: SHIPPED | OUTPATIENT
Start: 2025-02-25 | End: 2025-03-04

## 2025-02-25 ASSESSMENT — PATIENT HEALTH QUESTIONNAIRE - PHQ9
SUM OF ALL RESPONSES TO PHQ QUESTIONS 1-9: 25
CLINICAL INTERPRETATION OF PHQ2 SCORE: 6
5. POOR APPETITE OR OVEREATING: 3 - NEARLY EVERY DAY

## 2025-02-25 ASSESSMENT — ANXIETY QUESTIONNAIRES
3. WORRYING TOO MUCH ABOUT DIFFERENT THINGS: NEARLY EVERY DAY
2. NOT BEING ABLE TO STOP OR CONTROL WORRYING: NEARLY EVERY DAY
5. BEING SO RESTLESS THAT IT IS HARD TO SIT STILL: MORE THAN HALF THE DAYS
1. FEELING NERVOUS, ANXIOUS, OR ON EDGE: NEARLY EVERY DAY
7. FEELING AFRAID AS IF SOMETHING AWFUL MIGHT HAPPEN: NEARLY EVERY DAY
4. TROUBLE RELAXING: NEARLY EVERY DAY
GAD7 TOTAL SCORE: 20
6. BECOMING EASILY ANNOYED OR IRRITABLE: NEARLY EVERY DAY

## 2025-02-25 NOTE — ASSESSMENT & PLAN NOTE
Looking for a job, no luck so far. Willing to do therapy but hasn't yet seen a therapist.  - rec therapy  - dc wellbutrin (not helping)  - will try trazodone as pt is having difficulty sleeping

## 2025-02-25 NOTE — PROGRESS NOTES
"This note is formatted in an APSO format, for additional subjective and objective evaluation please scroll to the bottom of the note.    CC:  Chief Complaint   Patient presents with    Medication Management     Assessment/Plan:  Problem List Items Addressed This Visit       Acute pain of right shoulder    Was improving but now worse after falling on Feb 2 and landing bracing self with right hand. Now pain with abduction.  - PT         Relevant Orders    Referral to Physical Therapy    Insomnia    Major depressive disorder    Looking for a job, no luck so far. Willing to do therapy but hasn't yet seen a therapist.  - rec therapy  - dc wellbutrin (not helping)  - will try trazodone as pt is having difficulty sleeping         Relevant Medications    traZODone (DESYREL) 50 MG Tab    Tooth pain      Orders Placed This Encounter    Referral to Physical Therapy    traZODone (DESYREL) 50 MG Tab    amoxicillin (AMOXIL) 500 MG Cap       HISTORY OF PRESENT ILLNESS:   Was taking wellbutrin 150 BID but ran out 10 days ago. He reports no change in mood while taking the meds and no change when he was off the meds.    Problem   Tooth Pain   Acute Pain of Right Shoulder    Started after trying to lift a refrig in nov '24     Major Depressive Disorder    History of Severe MDD without suicidal ideation. Per chart review, failed 2 SSRIs.             Exam:    /82 (BP Location: Right arm, Patient Position: Sitting, BP Cuff Size: Adult)   Pulse 80   Temp 37.1 °C (98.8 °F) (Temporal)   Ht 1.88 m (6' 2\")   Wt 79.8 kg (176 lb)   SpO2 95%   BMI 22.60 kg/m²  Body mass index is 22.6 kg/m².    Gen: Well appearing. No apparent distress. Well developed. Sitting comfortably on chair  HEENT: NCAT, MMM  Neck: Supple, FROM  Chest: No deformities, Equal chest expansion  Lungs: Normal effort, CTA bilaterally.  CV: Regular rate and rhythm. Pulse palpable. No murmur  Abd: Non-distended.   Ext: No cyanosis. No edema.  Skin: Warm/dry. No visible " rashes.  Neuro: Non-focal. A&Ox4.  Psych: Normal behavior, normal affect      Return in about 1 month (around 3/25/2025). To f/u on trakyliee    Ranjith Daly MD  [facility]

## 2025-02-25 NOTE — ASSESSMENT & PLAN NOTE
Was improving but now worse after falling on Feb 2 and landing bracing self with right hand. Now pain with abduction.  - PT

## 2025-02-27 NOTE — Clinical Note
REFERRAL APPROVAL NOTICE         Sent on February 27, 2025                   Iain Murray  [address]  [address]  [place] [address]                   Dear Mr. Murray,    After a careful review of the medical information and benefit coverage, [facility] has processed your referral. See below for additional details.    If applicable, you must be actively enrolled with your insurance for coverage of the authorized service. If you have any questions regarding your coverage, please contact your insurance directly.    REFERRAL INFORMATION   Referral #:  [id number]  Referred-To Department    Referred-By Provider:  Physical Therapy    Ranjith Daly M.D.   Phys Therapy [facility]      [address]  [place] [address]  [phone] [address]  [place] [id number] [phone]    Referral Start Date:  02/25/2025  Referral End Date:   02/25/2026             SCHEDULING  If you do not already have an appointment, please call [phone] to make an appointment.     MORE INFORMATION  If you do not already have a [employer] [place] account, sign up at: [email or web address]  You can access your medical information, make appointments, see lab results, billing information, and more.  If you have questions regarding this referral, please contact  the [facility] Referrals department at:             [phone]. Monday - Friday 8:00AM - 5:00PM.     Sincerely,    [facility]

## 2025-03-04 ENCOUNTER — APPOINTMENT (OUTPATIENT)
Dept: MEDICAL GROUP | Facility: CLINIC | Age: 25
End: 2025-03-04
Payer: MEDICAID

## 2025-03-31 ENCOUNTER — APPOINTMENT (OUTPATIENT)
Dept: MEDICAL GROUP | Facility: CLINIC | Age: 25
End: 2025-03-31
Payer: MEDICAID

## 2025-04-11 ENCOUNTER — APPOINTMENT (OUTPATIENT)
Dept: MEDICAL GROUP | Facility: CLINIC | Age: 25
End: 2025-04-11
Payer: MEDICAID

## 2025-04-25 ENCOUNTER — OFFICE VISIT (OUTPATIENT)
Dept: MEDICAL GROUP | Facility: CLINIC | Age: 25
End: 2025-04-25
Payer: MEDICAID

## 2025-04-25 VITALS
BODY MASS INDEX: 21.43 KG/M2 | HEIGHT: 74 IN | RESPIRATION RATE: 16 BRPM | WEIGHT: 167 LBS | DIASTOLIC BLOOD PRESSURE: 92 MMHG | TEMPERATURE: 99.1 F | OXYGEN SATURATION: 93 % | SYSTOLIC BLOOD PRESSURE: 131 MMHG | HEART RATE: 123 BPM

## 2025-04-25 DIAGNOSIS — Z13.0 SCREENING FOR DISORDER OF BLOOD AND BLOOD-FORMING ORGANS: ICD-10-CM

## 2025-04-25 DIAGNOSIS — F32.9 MAJOR DEPRESSIVE DISORDER WITH CURRENT ACTIVE EPISODE, UNSPECIFIED DEPRESSION EPISODE SEVERITY, UNSPECIFIED WHETHER RECURRENT: ICD-10-CM

## 2025-04-25 PROCEDURE — 99214 OFFICE O/P EST MOD 30 MIN: CPT

## 2025-04-25 PROCEDURE — 3080F DIAST BP >= 90 MM HG: CPT

## 2025-04-25 PROCEDURE — 3075F SYST BP GE 130 - 139MM HG: CPT

## 2025-04-25 RX ORDER — AMITRIPTYLINE HYDROCHLORIDE 10 MG/1
10 TABLET ORAL NIGHTLY
Qty: 90 TABLET | Refills: 1 | Status: SHIPPED | OUTPATIENT
Start: 2025-04-25

## 2025-04-25 ASSESSMENT — PATIENT HEALTH QUESTIONNAIRE - PHQ9
5. POOR APPETITE OR OVEREATING: 1 - SEVERAL DAYS
SUM OF ALL RESPONSES TO PHQ QUESTIONS 1-9: 22
CLINICAL INTERPRETATION OF PHQ2 SCORE: 6

## 2025-04-25 ASSESSMENT — ANXIETY QUESTIONNAIRES
7. FEELING AFRAID AS IF SOMETHING AWFUL MIGHT HAPPEN: NEARLY EVERY DAY
6. BECOMING EASILY ANNOYED OR IRRITABLE: NEARLY EVERY DAY
4. TROUBLE RELAXING: MORE THAN HALF THE DAYS
IF YOU CHECKED OFF ANY PROBLEMS ON THIS QUESTIONNAIRE, HOW DIFFICULT HAVE THESE PROBLEMS MADE IT FOR YOU TO DO YOUR WORK, TAKE CARE OF THINGS AT HOME, OR GET ALONG WITH OTHER PEOPLE: SOMEWHAT DIFFICULT
3. WORRYING TOO MUCH ABOUT DIFFERENT THINGS: NEARLY EVERY DAY
1. FEELING NERVOUS, ANXIOUS, OR ON EDGE: MORE THAN HALF THE DAYS
5. BEING SO RESTLESS THAT IT IS HARD TO SIT STILL: SEVERAL DAYS
2. NOT BEING ABLE TO STOP OR CONTROL WORRYING: NEARLY EVERY DAY
GAD7 TOTAL SCORE: 17

## 2025-04-25 NOTE — ASSESSMENT & PLAN NOTE
Did not tolerate trazodone. Denies SI. PHQ-9 still high.  - D/c trazodone  - will try amitriptyline low dose at night. (Discussed side effects/warning signs)  - Check for anemia, thyroid issues  - Referral to psych  - Referral for therapy  - f/u 2 weeks   - consider increasing amitriptyline if needed/tolerated

## 2025-04-25 NOTE — PROGRESS NOTES
"This note is formatted in an APSO format, for additional subjective and objective evaluation please scroll to the bottom of the note.    CC:   Chief Complaint   Patient presents with    Follow-Up    Medication Management     Assessment/Plan:  Problem List Items Addressed This Visit       Major depressive disorder    Did not tolerate trazodone. Denies SI. PHQ-9 still high.  - D/c trazodone  - will try amitriptyline low dose at night. (Discussed side effects/warning signs)  - Check for anemia, thyroid issues  - Referral to psych  - Referral for therapy  - f/u 2 weeks   - consider increasing amitriptyline if needed/tolerated         Relevant Medications    amitriptyline (ELAVIL) 10 MG Tab    Other Relevant Orders    TSH WITH REFLEX TO FT4    CBC WITHOUT DIFFERENTIAL    Referral to Psychiatry    Referral to Behavioral Health     Other Visit Diagnoses         Screening for disorder of blood and blood-forming organs        Relevant Orders    Comp Metabolic Panel           Orders Placed This Encounter    TSH WITH REFLEX TO FT4    CBC WITHOUT DIFFERENTIAL    Comp Metabolic Panel    Referral to Psychiatry    Referral to Behavioral Health    amitriptyline (ELAVIL) 10 MG Tab       HISTORY OF PRESENT ILLNESS:     Problem   Major Depressive Disorder    History of Severe MDD without suicidal ideation. Per chart review, failed 2 SSRIs. Did not respond to wellbutrin. Did not tolerate trazodone.           Exam:    BP (!) 131/92 (BP Location: Left arm, Patient Position: Sitting, BP Cuff Size: Adult)   Pulse (!) 123   Temp 37.3 °C (99.1 °F) (Temporal)   Resp 16   Ht 1.88 m (6' 2\")   Wt 75.8 kg (167 lb)   SpO2 93%   BMI 21.44 kg/m²  Body mass index is 21.44 kg/m².    Gen: Well appearing. No apparent distress. Well developed. Sitting comfortably on chair  HEENT: NCAT, MMM  Neck: Supple, FROM  Chest: No deformities, Equal chest expansion  Lungs: Normal effort, CTA bilaterally.  CV: Regular rate and rhythm. Pulse palpable. No " murmur  Abd: Non-distended.   Ext: No cyanosis. No edema.  Skin: Warm/dry. No visible rashes.  Neuro: Non-focal. A&Ox4.  Psych: Normal behavior, normal affect    Return in about 2 weeks (around 5/9/2025) for fu amitriptyline.    Ranjith Daly MD  [facility]

## 2025-05-01 NOTE — Clinical Note
REFERRAL APPROVAL NOTICE         Sent on May 1, 2025                   Iain Murray  [address]  [address]  [place] [address]                   Dear Mr. Murray,    After a careful review of the medical information and benefit coverage, [facility] has processed your referral. See below for additional details.    If applicable, you must be actively enrolled with your insurance for coverage of the authorized service. If you have any questions regarding your coverage, please contact your insurance directly.    REFERRAL INFORMATION   Referral #:  [id number]  Referred-To Department    Referred-By Provider:  Behavioral Health    Ranjith Daly M.D.   Behavioral Health Outpatient      [address]  [place] [address]  [phone] [address]  [place] [id number] [phone]    Referral Start Date:  04/25/2025  Referral End Date:   04/25/2026             SCHEDULING  If you do not already have an appointment, please call [phone] to make an appointment.     MORE INFORMATION  If you do not already have a [employer] account, sign up at: [email or web address]  You can access your medical information, make appointments, see lab results, billing information, and more.  If you have questions regarding this referral, please contact  the [facility] Referrals department at:             [phone]. Monday - Friday 8:00AM - 5:00PM.     Sincerely,    [facility]

## 2025-05-01 NOTE — Clinical Note
REFERRAL APPROVAL NOTICE         Sent on May 1, 2025                   Iain Murray  [address]  [address]  [place] [address]                   Dear Mr. Murray,    After a careful review of the medical information and benefit coverage, [facility] has processed your referral. See below for additional details.    If applicable, you must be actively enrolled with your insurance for coverage of the authorized service. If you have any questions regarding your coverage, please contact your insurance directly.    REFERRAL INFORMATION   Referral #:  [id number]  Referred-To Provider    Referred-By Provider:       Ranjith Daly M.D.   [facility]      [address]  [place] [address]  [phone] [address]  [place] [address]  [phone]    Referral Start Date:  04/25/2025  Referral End Date:   04/25/2026             SCHEDULING  If you do not already have an appointment, please call [phone] to make an appointment.     MORE INFORMATION  If you do not already have a [employer] account, sign up at: [email or web address]  You can access your medical information, make appointments, see lab results, billing information, and more.  If you have questions regarding this referral, please contact  the [facility] Referrals department at:             [phone]. Monday - Friday 8:00AM - 5:00PM.     Sincerely,    [facility]

## 2025-05-22 ENCOUNTER — APPOINTMENT (OUTPATIENT)
Dept: PHYSICAL THERAPY | Facility: MEDICAL CENTER | Age: 25
End: 2025-05-22
Payer: MEDICAID

## 2025-05-27 ENCOUNTER — APPOINTMENT (OUTPATIENT)
Dept: PHYSICAL THERAPY | Facility: MEDICAL CENTER | Age: 25
End: 2025-05-27
Payer: MEDICAID

## 2025-05-29 ENCOUNTER — APPOINTMENT (OUTPATIENT)
Dept: PHYSICAL THERAPY | Facility: MEDICAL CENTER | Age: 25
End: 2025-05-29
Payer: MEDICAID

## 2025-06-03 ENCOUNTER — APPOINTMENT (OUTPATIENT)
Dept: PHYSICAL THERAPY | Facility: MEDICAL CENTER | Age: 25
End: 2025-06-03
Payer: MEDICAID

## 2025-06-05 ENCOUNTER — APPOINTMENT (OUTPATIENT)
Dept: PHYSICAL THERAPY | Facility: MEDICAL CENTER | Age: 25
End: 2025-06-05
Payer: MEDICAID

## 2025-06-10 ENCOUNTER — APPOINTMENT (OUTPATIENT)
Dept: PHYSICAL THERAPY | Facility: MEDICAL CENTER | Age: 25
End: 2025-06-10
Payer: MEDICAID

## 2025-06-12 ENCOUNTER — APPOINTMENT (OUTPATIENT)
Dept: PHYSICAL THERAPY | Facility: MEDICAL CENTER | Age: 25
End: 2025-06-12
Payer: MEDICAID

## 2025-06-17 ENCOUNTER — APPOINTMENT (OUTPATIENT)
Dept: PHYSICAL THERAPY | Facility: MEDICAL CENTER | Age: 25
End: 2025-06-17
Payer: MEDICAID

## 2025-06-19 ENCOUNTER — APPOINTMENT (OUTPATIENT)
Dept: PHYSICAL THERAPY | Facility: MEDICAL CENTER | Age: 25
End: 2025-06-19
Payer: MEDICAID